# Patient Record
Sex: MALE | Race: BLACK OR AFRICAN AMERICAN | Employment: FULL TIME | URBAN - METROPOLITAN AREA
[De-identification: names, ages, dates, MRNs, and addresses within clinical notes are randomized per-mention and may not be internally consistent; named-entity substitution may affect disease eponyms.]

---

## 2017-02-15 ENCOUNTER — TELEPHONE (OUTPATIENT)
Dept: FAMILY MEDICINE CLINIC | Facility: CLINIC | Age: 57
End: 2017-02-15

## 2017-02-15 RX ORDER — BENZONATATE 200 MG/1
CAPSULE ORAL
Qty: 30 CAPSULE | Refills: 1 | Status: SHIPPED | OUTPATIENT
Start: 2017-02-15 | End: 2018-05-04 | Stop reason: ALTCHOICE

## 2017-02-16 ENCOUNTER — OFFICE VISIT (OUTPATIENT)
Dept: FAMILY MEDICINE CLINIC | Facility: CLINIC | Age: 57
End: 2017-02-16

## 2017-02-16 VITALS
TEMPERATURE: 97 F | SYSTOLIC BLOOD PRESSURE: 148 MMHG | BODY MASS INDEX: 22.12 KG/M2 | WEIGHT: 158 LBS | HEIGHT: 71 IN | DIASTOLIC BLOOD PRESSURE: 98 MMHG | HEART RATE: 64 BPM

## 2017-02-16 DIAGNOSIS — R05.9 COUGH: ICD-10-CM

## 2017-02-16 DIAGNOSIS — B34.9 VIRAL INFECTION: Primary | ICD-10-CM

## 2017-02-16 DIAGNOSIS — R03.0 ELEVATED BP WITHOUT DIAGNOSIS OF HYPERTENSION: ICD-10-CM

## 2017-02-16 DIAGNOSIS — R68.83 CHILLS (WITHOUT FEVER): ICD-10-CM

## 2017-02-16 DIAGNOSIS — R53.1 WEAKNESS GENERALIZED: ICD-10-CM

## 2017-02-16 LAB
BILIRUBIN, POC: NORMAL
BLOOD URINE, POC: NORMAL
CLARITY, POC: CLEAR
COLOR, POC: YELLOW
GLUCOSE URINE, POC: NORMAL
INFLUENZA A ANTIBODY: NORMAL
INFLUENZA B ANTIBODY: NORMAL
KETONES, POC: NORMAL
LEUKOCYTE EST, POC: NORMAL
NITRITE, POC: NORMAL
PH, POC: 6
PROTEIN, POC: NORMAL
SPECIFIC GRAVITY, POC: 1.02
UROBILINOGEN, POC: 0.2

## 2017-02-16 PROCEDURE — 87804 INFLUENZA ASSAY W/OPTIC: CPT | Performed by: FAMILY MEDICINE

## 2017-02-16 PROCEDURE — 81003 URINALYSIS AUTO W/O SCOPE: CPT | Performed by: FAMILY MEDICINE

## 2017-02-16 PROCEDURE — 99214 OFFICE O/P EST MOD 30 MIN: CPT | Performed by: FAMILY MEDICINE

## 2017-02-16 ASSESSMENT — PATIENT HEALTH QUESTIONNAIRE - PHQ9
1. LITTLE INTEREST OR PLEASURE IN DOING THINGS: 0
2. FEELING DOWN, DEPRESSED OR HOPELESS: 0
SUM OF ALL RESPONSES TO PHQ9 QUESTIONS 1 & 2: 0
SUM OF ALL RESPONSES TO PHQ QUESTIONS 1-9: 0

## 2017-02-16 ASSESSMENT — ENCOUNTER SYMPTOMS
EYES NEGATIVE: 1
ABDOMINAL PAIN: 0
VOMITING: 0
DIARRHEA: 1
SORE THROAT: 0
SHORTNESS OF BREATH: 0
NAUSEA: 0
BLOOD IN STOOL: 0
COUGH: 1

## 2017-03-28 ENCOUNTER — HOSPITAL ENCOUNTER (OUTPATIENT)
Age: 57
Setting detail: SPECIMEN
Discharge: HOME | End: 2017-03-28
Payer: COMMERCIAL

## 2017-03-28 ENCOUNTER — OFFICE VISIT (OUTPATIENT)
Dept: FAMILY MEDICINE CLINIC | Age: 57
End: 2017-03-28
Payer: COMMERCIAL

## 2017-03-28 VITALS
BODY MASS INDEX: 22.9 KG/M2 | DIASTOLIC BLOOD PRESSURE: 80 MMHG | WEIGHT: 160 LBS | HEART RATE: 65 BPM | HEIGHT: 70 IN | SYSTOLIC BLOOD PRESSURE: 120 MMHG

## 2017-03-28 DIAGNOSIS — M54.50 CHRONIC MIDLINE LOW BACK PAIN WITHOUT SCIATICA: ICD-10-CM

## 2017-03-28 DIAGNOSIS — L98.9 SKIN LESION OF CHEST WALL: ICD-10-CM

## 2017-03-28 DIAGNOSIS — M54.50 CHRONIC MIDLINE LOW BACK PAIN WITHOUT SCIATICA: Primary | ICD-10-CM

## 2017-03-28 DIAGNOSIS — Z13.9 SCREENING: ICD-10-CM

## 2017-03-28 DIAGNOSIS — M47.812 OSTEOARTHRITIS OF CERVICAL SPINE, UNSPECIFIED SPINAL OSTEOARTHRITIS COMPLICATION STATUS: ICD-10-CM

## 2017-03-28 DIAGNOSIS — G89.29 CHRONIC MIDLINE LOW BACK PAIN WITHOUT SCIATICA: Primary | ICD-10-CM

## 2017-03-28 DIAGNOSIS — L91.0 KELOID SCAR: ICD-10-CM

## 2017-03-28 DIAGNOSIS — G89.29 CHRONIC MIDLINE LOW BACK PAIN WITHOUT SCIATICA: ICD-10-CM

## 2017-03-28 LAB
ALBUMIN SERPL-MCNC: 4.2 G/DL (ref 3.5–5.2)
ALBUMIN/GLOBULIN RATIO: 1.7 (ref 1–2.5)
ALP BLD-CCNC: 92 U/L (ref 40–129)
ALT SERPL-CCNC: 28 U/L (ref 5–41)
ANION GAP SERPL CALCULATED.3IONS-SCNC: 14 MMOL/L (ref 9–17)
AST SERPL-CCNC: 21 U/L
BILIRUB SERPL-MCNC: 0.59 MG/DL (ref 0.3–1.2)
BUN BLDV-MCNC: 15 MG/DL (ref 6–20)
BUN/CREAT BLD: ABNORMAL (ref 9–20)
CALCIUM SERPL-MCNC: 9.4 MG/DL (ref 8.6–10.4)
CHLORIDE BLD-SCNC: 105 MMOL/L (ref 98–107)
CO2: 25 MMOL/L (ref 20–31)
CREAT SERPL-MCNC: 0.92 MG/DL (ref 0.7–1.2)
GFR AFRICAN AMERICAN: >60 ML/MIN
GFR NON-AFRICAN AMERICAN: >60 ML/MIN
GFR SERPL CREATININE-BSD FRML MDRD: ABNORMAL ML/MIN/{1.73_M2}
GFR SERPL CREATININE-BSD FRML MDRD: ABNORMAL ML/MIN/{1.73_M2}
GLUCOSE BLD-MCNC: 103 MG/DL (ref 70–99)
HEPATITIS C ANTIBODY: NONREACTIVE
HIV AG/AB: NONREACTIVE
POTASSIUM SERPL-SCNC: 4.3 MMOL/L (ref 3.7–5.3)
PROSTATE SPECIFIC ANTIGEN: 2.04 UG/L
SODIUM BLD-SCNC: 144 MMOL/L (ref 135–144)
TOTAL PROTEIN: 6.7 G/DL (ref 6.4–8.3)

## 2017-03-28 PROCEDURE — 36415 COLL VENOUS BLD VENIPUNCTURE: CPT | Performed by: FAMILY MEDICINE

## 2017-03-28 PROCEDURE — 99214 OFFICE O/P EST MOD 30 MIN: CPT | Performed by: FAMILY MEDICINE

## 2017-03-28 ASSESSMENT — ENCOUNTER SYMPTOMS
COUGH: 0
BACK PAIN: 1
ABDOMINAL PAIN: 0
BOWEL INCONTINENCE: 0
EYES NEGATIVE: 1
SHORTNESS OF BREATH: 0
BLOOD IN STOOL: 0

## 2017-11-16 ENCOUNTER — TELEPHONE (OUTPATIENT)
Dept: FAMILY MEDICINE CLINIC | Age: 57
End: 2017-11-16

## 2017-11-16 DIAGNOSIS — R05.9 COUGH: Primary | ICD-10-CM

## 2017-11-16 RX ORDER — GUAIFENESIN AND CODEINE PHOSPHATE 100; 10 MG/5ML; MG/5ML
5 SOLUTION ORAL 3 TIMES DAILY PRN
Qty: 118 ML | Refills: 0 | Status: SHIPPED | OUTPATIENT
Start: 2017-11-16 | End: 2018-03-01 | Stop reason: SDUPTHER

## 2017-11-16 RX ORDER — GUAIFENESIN AND CODEINE PHOSPHATE 100; 10 MG/5ML; MG/5ML
5 SOLUTION ORAL 3 TIMES DAILY PRN
Qty: 1 BOTTLE | Refills: 0 | Status: CANCELLED | OUTPATIENT
Start: 2017-11-16 | End: 2017-11-23

## 2017-11-16 NOTE — TELEPHONE ENCOUNTER
He needs to see a podiatrist for his toenails. I am not going to send in something else for that. Cough med sent in oct 25. Does he want refill of that?

## 2017-11-16 NOTE — TELEPHONE ENCOUNTER
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  Patient also states that his toenails are turning brownish black again and he would like something sent in for that also, if so please send to [facility] on [email or web address]  Patient last appt 3/28/17 but he will be coming in on Tuesday for his back pain  Health Maintenance   Topic Date Due    Flu vaccine (1) 09/05/2018 (Originally 9/1/2017)    Colon cancer screen colonoscopy  09/14/2020    Lipid screen  03/29/2021    DTaP/Tdap/Td vaccine (2 - Td) 09/08/2024    Hepatitis C screen  Completed    HIV screen  Completed             (applicable per patient's age: Cancer Screenings, Depression Screening, Fall Risk Screening, Immunizations)    AST (U/L)   Date Value   03/28/2017 21     ALT (U/L)   Date Value   03/28/2017 28     BUN (mg/dL)   Date Value   03/28/2017 15      (goal A1C is < 7)   (goal LDL is <100) need 30-50% reduction from baseline     BP Readings from Last 3 Encounters:   03/28/17 120/80   02/16/17 (!) 148/98   10/25/16 124/82    (goal /80)      All Future Testing planned in [facility]:      Next Visit Date:  Future Appointments  Date Time Provider Marli Mattson   11/21/2017 3:15 PM MD jim Milton [facility]            Patient Active Problem List:     Osteoarthritis     Chronic back pain     Osteoarthritis of cervical spine

## 2018-03-01 DIAGNOSIS — R05.9 COUGH: ICD-10-CM

## 2018-03-01 RX ORDER — GUAIFENESIN AND CODEINE PHOSPHATE 100; 10 MG/5ML; MG/5ML
5 SOLUTION ORAL 3 TIMES DAILY PRN
Qty: 118 ML | Refills: 0 | Status: SHIPPED | OUTPATIENT
Start: 2018-03-01 | End: 2018-10-19 | Stop reason: SDUPTHER

## 2018-03-09 ENCOUNTER — OFFICE VISIT (OUTPATIENT)
Dept: FAMILY MEDICINE CLINIC | Age: 58
End: 2018-03-09
Payer: COMMERCIAL

## 2018-03-09 VITALS
DIASTOLIC BLOOD PRESSURE: 84 MMHG | HEART RATE: 72 BPM | WEIGHT: 158 LBS | BODY MASS INDEX: 22.67 KG/M2 | TEMPERATURE: 96.7 F | SYSTOLIC BLOOD PRESSURE: 160 MMHG

## 2018-03-09 DIAGNOSIS — L91.0 KELOID SCAR OF SKIN: ICD-10-CM

## 2018-03-09 DIAGNOSIS — K21.9 GASTROESOPHAGEAL REFLUX DISEASE WITHOUT ESOPHAGITIS: ICD-10-CM

## 2018-03-09 DIAGNOSIS — R05.9 COUGH: ICD-10-CM

## 2018-03-09 DIAGNOSIS — I10 ESSENTIAL HYPERTENSION: Primary | ICD-10-CM

## 2018-03-09 PROCEDURE — 99214 OFFICE O/P EST MOD 30 MIN: CPT | Performed by: FAMILY MEDICINE

## 2018-03-09 RX ORDER — LOSARTAN POTASSIUM 25 MG/1
25 TABLET ORAL DAILY
Qty: 30 TABLET | Refills: 3 | Status: SHIPPED | OUTPATIENT
Start: 2018-03-09 | End: 2018-05-25 | Stop reason: SDUPTHER

## 2018-03-09 RX ORDER — OMEPRAZOLE 20 MG/1
20 CAPSULE, DELAYED RELEASE ORAL 2 TIMES DAILY
Qty: 30 CAPSULE | Refills: 3 | Status: SHIPPED | OUTPATIENT
Start: 2018-03-09 | End: 2019-04-05 | Stop reason: SDUPTHER

## 2018-03-09 ASSESSMENT — ENCOUNTER SYMPTOMS
CONSTIPATION: 0
EYES NEGATIVE: 1
COUGH: 1
SHORTNESS OF BREATH: 0
ABDOMINAL PAIN: 0
HEARTBURN: 1

## 2018-03-09 NOTE — PROGRESS NOTES
[facility] PHYSICIANS  DEREK MACIAS [facility]  [address] [id number]  Dept: [phone]    3/9/2018    CHIEF COMPLAINT    Chief Complaint   Patient presents with    Back Pain    Cough     2 weeks    Mole       HPI    Debora Collazo is a 65 y.o. male who presents   Chief Complaint   Patient presents with    Back Pain    Cough     2 weeks    Mole   . Recheck htn. Stopped taking  Med as he felt well. Has a scar on rt chest wall that he wants removed. He was referred to derm previously but didn't go. He is very anxious about having any procedure and wants to be sedated when it is done. Hypertension   This is a chronic problem. The current episode started more than 1 year ago. The problem is uncontrolled. Associated symptoms include chest pain (had one episode that woke him from sleep, resolved after he got up and moved around) and malaise/fatigue. Pertinent negatives include no palpitations or shortness of breath. Risk factors for coronary artery disease include male gender. Past treatments include diuretics and calcium channel blockers. Compliance problems: stopped medication. Gastroesophageal Reflux   He complains of chest pain (had one episode that woke him from sleep, resolved after he got up and moved around), coughing and heartburn. He reports no abdominal pain. This is a chronic problem. The current episode started more than 1 year ago. The problem occurs frequently. The heartburn wakes him from sleep. The heartburn does not limit his activity. The heartburn changes with position. The symptoms are aggravated by lying down and ETOH. Associated symptoms include weight loss. Risk factors include ETOH use. He has tried nothing for the symptoms. REVIEW OF SYSTEMS    Review of Systems   Constitutional: Positive for malaise/fatigue and weight loss. Negative for fever. HENT: Negative. Eyes: Negative. Respiratory: Positive for cough. - External Referral To Dermatology    4. Cough  Chronic, most likely related to reflux based on symptoms. Thania Mas received counseling on the following healthy behaviors: nutrition, medication adherence and decrease in alcohol consumption  Reviewed prior labs and health maintenance. Continue current medications, diet and exercise. Discussed use, benefit, and side effects of prescribed medications. Barriers to medication compliance addressed. Patient given educational materials - see patient instructions. All patient questions answered. Patient voiced understanding. Return in about 2 months (around 5/9/2018) for htn.         Electronically signed by Branden Rice MD on 3/9/18 at 3:45 PM

## 2018-03-09 NOTE — PATIENT INSTRUCTIONS
do not need. Keloid scarring can happen after these procedures. When should you call for help? Call your doctor now or seek immediate medical care if:  ? · You have signs of infection, such as:  ¨ Increased pain, swelling, warmth, or redness. ¨ Red streaks leading from the wound. ¨ Pus draining from the wound. ¨ A fever. ? Watch closely for changes in your health, and be sure to contact your doctor if:  ? · You do not get better as expected. Where can you learn more? Go to [email or web address] org and sign in to your [employer] account. Enter E474 in the [facility] box to learn more about \"Keloids: Care Instructions. \"     If you do not have an account, please click on the \"Sign Up Now\" link. Current as of: October 13, 2016  Content Version: 11.5  © 5913-2639 Healthwise, Incorporated. Care instructions adapted under license by [facility] ([facility]). If you have questions about a medical condition or this instruction, always ask your healthcare professional. [address] any warranty or liability for your use of this information.

## 2018-03-21 RX ORDER — TERBINAFINE HYDROCHLORIDE 250 MG/1
250 TABLET ORAL DAILY
Qty: 30 TABLET | Refills: 0 | Status: SHIPPED | OUTPATIENT
Start: 2018-03-21 | End: 2018-05-04 | Stop reason: ALTCHOICE

## 2018-04-24 LAB
BUN BLDV-MCNC: NORMAL MG/DL
CALCIUM SERPL-MCNC: NORMAL MG/DL
CHLORIDE BLD-SCNC: NORMAL MMOL/L
CO2: NORMAL MMOL/L
CREAT SERPL-MCNC: NORMAL MG/DL
GFR CALCULATED: NORMAL
GLUCOSE BLD-MCNC: NORMAL MG/DL
POTASSIUM SERPL-SCNC: NORMAL MMOL/L
SODIUM BLD-SCNC: NORMAL MMOL/L

## 2018-05-04 ENCOUNTER — OFFICE VISIT (OUTPATIENT)
Dept: FAMILY MEDICINE CLINIC | Age: 58
End: 2018-05-04
Payer: COMMERCIAL

## 2018-05-04 VITALS
WEIGHT: 155 LBS | HEART RATE: 63 BPM | BODY MASS INDEX: 22.19 KG/M2 | DIASTOLIC BLOOD PRESSURE: 80 MMHG | OXYGEN SATURATION: 98 % | SYSTOLIC BLOOD PRESSURE: 120 MMHG | HEIGHT: 70 IN

## 2018-05-04 DIAGNOSIS — I10 ESSENTIAL HYPERTENSION: ICD-10-CM

## 2018-05-04 DIAGNOSIS — I25.10 CORONARY ARTERY DISEASE INVOLVING NATIVE CORONARY ARTERY OF NATIVE HEART WITHOUT ANGINA PECTORIS: Primary | ICD-10-CM

## 2018-05-04 DIAGNOSIS — K21.9 GASTROESOPHAGEAL REFLUX DISEASE WITHOUT ESOPHAGITIS: ICD-10-CM

## 2018-05-04 PROCEDURE — 99214 OFFICE O/P EST MOD 30 MIN: CPT | Performed by: FAMILY MEDICINE

## 2018-05-04 RX ORDER — ASPIRIN 81 MG/1
81 TABLET, CHEWABLE ORAL
COMMUNITY
Start: 2018-04-26 | End: 2022-10-11

## 2018-05-04 RX ORDER — NITROGLYCERIN 0.4 MG/1
0.4 TABLET SUBLINGUAL EVERY 5 MIN PRN
COMMUNITY
End: 2019-04-05 | Stop reason: SDUPTHER

## 2018-05-04 RX ORDER — ATORVASTATIN CALCIUM 40 MG/1
40 TABLET, FILM COATED ORAL DAILY
COMMUNITY

## 2018-05-04 ASSESSMENT — ENCOUNTER SYMPTOMS
COUGH: 0
BACK PAIN: 1
CHEST TIGHTNESS: 0
SHORTNESS OF BREATH: 0
ABDOMINAL PAIN: 0
CONSTIPATION: 0
EYES NEGATIVE: 1

## 2018-05-04 ASSESSMENT — PATIENT HEALTH QUESTIONNAIRE - PHQ9
SUM OF ALL RESPONSES TO PHQ QUESTIONS 1-9: 0
SUM OF ALL RESPONSES TO PHQ9 QUESTIONS 1 & 2: 0
2. FEELING DOWN, DEPRESSED OR HOPELESS: 0
1. LITTLE INTEREST OR PLEASURE IN DOING THINGS: 0

## 2018-05-25 DIAGNOSIS — I10 ESSENTIAL HYPERTENSION: ICD-10-CM

## 2018-05-25 RX ORDER — LOSARTAN POTASSIUM 25 MG/1
25 TABLET ORAL DAILY
Qty: 90 TABLET | Refills: 1 | Status: SHIPPED | OUTPATIENT
Start: 2018-05-25 | End: 2019-04-05 | Stop reason: SDUPTHER

## 2018-08-03 ENCOUNTER — OFFICE VISIT (OUTPATIENT)
Dept: FAMILY MEDICINE CLINIC | Age: 58
End: 2018-08-03
Payer: COMMERCIAL

## 2018-08-03 VITALS
BODY MASS INDEX: 22.38 KG/M2 | HEART RATE: 78 BPM | SYSTOLIC BLOOD PRESSURE: 130 MMHG | WEIGHT: 156 LBS | DIASTOLIC BLOOD PRESSURE: 82 MMHG

## 2018-08-03 DIAGNOSIS — M25.562 ACUTE PAIN OF LEFT KNEE: Primary | ICD-10-CM

## 2018-08-03 PROCEDURE — 99213 OFFICE O/P EST LOW 20 MIN: CPT | Performed by: FAMILY MEDICINE

## 2018-08-03 RX ORDER — TRAMADOL HYDROCHLORIDE 50 MG/1
50 TABLET ORAL EVERY 6 HOURS PRN
Qty: 28 TABLET | Refills: 0 | Status: SHIPPED | OUTPATIENT
Start: 2018-08-03 | End: 2020-06-18 | Stop reason: SDUPTHER

## 2018-08-03 ASSESSMENT — ENCOUNTER SYMPTOMS
SHORTNESS OF BREATH: 0
COUGH: 0
EYES NEGATIVE: 1

## 2018-08-03 NOTE — PROGRESS NOTES
Cans of beer per week      Comment: 4-5 beers daily    Drug use: No    Sexual activity: Yes     Partners: Female     Other Topics Concern    None     Social History Narrative    None       SURGICAL HISTORY    Past Surgical History:   Procedure Laterality Date    ANKLE SURGERY Left     APPENDECTOMY      COLONOSCOPY      CORONARY ANGIOPLASTY WITH STENT PLACEMENT  04/2018    KNEE SURGERY Left     patella       CURRENT MEDICATIONS    Current Outpatient Prescriptions   Medication Sig Dispense Refill    ticagrelor (BRILINTA) 90 MG TABS tablet Take 90 mg by mouth      traMADol (ULTRAM) 50 MG tablet Take 1 tablet by mouth every 6 hours as needed for Pain for up to 7 days. Intended supply: 7 days. Take lowest dose possible to manage pain. 28 tablet 0    losartan (COZAAR) 25 MG tablet Take 1 tablet by mouth daily 90 tablet 1    aspirin 81 MG chewable tablet Take 81 mg by mouth      nitroGLYCERIN (NITROSTAT) 0.4 MG SL tablet Place 0.4 mg under the tongue every 5 minutes as needed for Chest pain up to max of 3 total doses. If no relief after 1 dose, call 911.  atorvastatin (LIPITOR) 40 MG tablet Take 40 mg by mouth daily      omeprazole (PRILOSEC) 20 MG delayed release capsule Take 1 capsule by mouth 2 times daily 30 capsule 3    ibuprofen (ADVIL;MOTRIN) 800 MG tablet Take 1 tablet by mouth every 8 hours as needed for Pain 90 tablet 0     No current facility-administered medications for this visit. ALLERGIES    No Known Allergies    PHYSICAL EXAM   Physical Exam   Constitutional: He is oriented to person, place, and time. He appears well-developed and well-nourished. HENT:   Head: Normocephalic. Mouth/Throat: Oropharynx is clear and moist.   Eyes: Pupils are equal, round, and reactive to light. Neck: Normal range of motion. Neck supple. No thyromegaly present. Cardiovascular: Normal rate, regular rhythm and normal heart sounds. No murmur heard.   Pulmonary/Chest: Effort normal and breath sounds normal. He has no wheezes. He has no rales. Abdominal: Soft. There is no tenderness. There is no rebound and no guarding. Musculoskeletal: Normal range of motion. He exhibits tenderness (left knee with effusion proximal medial knee. prior surgical scar). He exhibits no edema or deformity. Lymphadenopathy:     He has no cervical adenopathy. Neurological: He is alert and oriented to person, place, and time. Skin: Skin is warm and dry. Psychiatric: He has a normal mood and affect. His behavior is normal.   Vitals reviewed. Assessment/PLan  1. Acute pain of left knee  Acute. Get a knee brace with patellar opening. See ortho. - XR KNEE LEFT (3 VIEWS); Future  - traMADol (ULTRAM) 50 MG tablet; Take 1 tablet by mouth every 6 hours as needed for Pain for up to 7 days. Intended supply: 7 days. Take lowest dose possible to manage pain. Dispense: 28 tablet; Refill: 0  - Duke Ceja MD, Orthopedics [facility] received counseling on the following healthy behaviors: medication adherence  Reviewed prior labs and health maintenance. Continue current medications, diet and exercise. Discussed use, benefit, and side effects of prescribed medications. Barriers to medication compliance addressed. Patient given educational materials - see patient instructions. All patient questions answered. Patient voiced understanding. Return if symptoms worsen or fail to improve.         Electronically signed by Crystal Salguero MD on 8/3/18 at 11:18 AM

## 2018-08-13 ENCOUNTER — OFFICE VISIT (OUTPATIENT)
Dept: ORTHOPEDIC SURGERY | Age: 58
End: 2018-08-13
Payer: COMMERCIAL

## 2018-08-13 VITALS — WEIGHT: 160 LBS | HEIGHT: 70 IN | BODY MASS INDEX: 22.9 KG/M2

## 2018-08-13 DIAGNOSIS — M25.562 ACUTE PAIN OF LEFT KNEE: ICD-10-CM

## 2018-08-13 DIAGNOSIS — M17.12 OSTEOARTHRITIS OF LEFT PATELLOFEMORAL JOINT: Primary | ICD-10-CM

## 2018-08-13 PROCEDURE — 20610 DRAIN/INJ JOINT/BURSA W/O US: CPT | Performed by: ORTHOPAEDIC SURGERY

## 2018-08-13 PROCEDURE — 99203 OFFICE O/P NEW LOW 30 MIN: CPT | Performed by: ORTHOPAEDIC SURGERY

## 2018-08-13 RX ORDER — TRIAMCINOLONE ACETONIDE 40 MG/ML
40 INJECTION, SUSPENSION INTRA-ARTICULAR; INTRAMUSCULAR ONCE
Status: COMPLETED | OUTPATIENT
Start: 2018-08-13 | End: 2018-08-13

## 2018-08-13 RX ORDER — LIDOCAINE HYDROCHLORIDE 10 MG/ML
5 INJECTION, SOLUTION INFILTRATION; PERINEURAL ONCE
Status: COMPLETED | OUTPATIENT
Start: 2018-08-13 | End: 2018-08-13

## 2018-08-13 RX ADMIN — LIDOCAINE HYDROCHLORIDE 5 ML: 10 INJECTION, SOLUTION INFILTRATION; PERINEURAL at 11:59

## 2018-08-13 RX ADMIN — TRIAMCINOLONE ACETONIDE 40 MG: 40 INJECTION, SUSPENSION INTRA-ARTICULAR; INTRAMUSCULAR at 12:00

## 2018-08-13 NOTE — PROGRESS NOTES
Orthopedic Knee Encounter Note     Chief complaint: Left knee pain    HPI: Mayuri Julien is a 65 y.o. old male who presents for evaluation of left knee pain. He reports a history of left knee surgical procedure done over 20 years ago while in [place]. It's unclear as to nature of his surgery. He states however that he is been doing well since then with some intermittent soreness see. However about 3 weeks ago he stepped into a hole and twisted the left knee. Since then he's had increased pain and swelling. His pain is intermittent and diffusely involved the anterior aspect of the knee. Typically exacerbated by weightbearing activities. He has stiffness especially after a period of rest.  He denies any radicular symptoms or associated numbness or tingling but does report having some catching and locking sensations in the knee. Previous treatment:    NSAIDs: Ibuprofen 800 mg    Injections:  None    Physical therapy: No    Surgeries: History of left knee surgery about 20 years ago in [place] not otherwise specified    Review of Systems:     Constitution: no fever or chills   Pain level: 8/10  Musculoskeletal: As noted in the HPI   Neurologic: no neurologic symptoms    Past Medical History  Ester Boyce  has a past medical history of Alcohol abuse; Allergic rhinitis; CAD (coronary artery disease); Chronic back pain; GERD (gastroesophageal reflux disease); Hypertension; and Osteoarthritis. Past Surgical History  Ester Boyce  has a past surgical history that includes Ankle surgery (Left); knee surgery (Left); Appendectomy; Colonoscopy; and Coronary angioplasty with stent (04/2018).     Current Medications  Current Outpatient Prescriptions   Medication Sig Dispense Refill    ticagrelor (BRILINTA) 90 MG TABS tablet Take 90 mg by mouth      losartan (COZAAR) 25 MG tablet Take 1 tablet by mouth daily 90 tablet 1    aspirin 81 MG chewable tablet Take 81 mg by mouth      nitroGLYCERIN (NITROSTAT) 0.4 MG SL tablet Place 0.4 mg under the tongue every 5 minutes as needed for Chest pain up to max of 3 total doses. If no relief after 1 dose, call 911.  atorvastatin (LIPITOR) 40 MG tablet Take 40 mg by mouth daily      omeprazole (PRILOSEC) 20 MG delayed release capsule Take 1 capsule by mouth 2 times daily 30 capsule 3    ibuprofen (ADVIL;MOTRIN) 800 MG tablet Take 1 tablet by mouth every 8 hours as needed for Pain 90 tablet 0     No current facility-administered medications for this visit. Allergies  Allergies have been reviewed. Farhana Cantor has No Known Allergies. Social History  Farhana Cantor  reports that he quit smoking about 12 years ago. He smoked 0.00 packs per day for 15.00 years. He has never used smokeless tobacco. He reports that he drinks about 12.6 oz of alcohol per week . He reports that he does not use drugs. Family History  Adan's family history includes Heart Disease in his brother, father, mother, and sister; Other (age of onset: 47) in his sister. Physical Exam:     Ht 5' 10\" (1.778 m)   Wt 160 lb (72.6 kg)   BMI 22.96 kg/m²    General Appearance: alert, well appearing, and in no distress  Mental Status: alert, oriented to person, place, and time  Gait: antalgic  Hips: Good pain-free ROM without crepitation    Knee: Bilateral    Skin: warm and dry, no rash or erythema. Moderate effusion in the left knee. Well-healed anterior midline incisional scar over the left knee. Vasculature: 2+ pedal pulses bilaterally  Neuro: Sensation grossly intact to light touch diffusely  Alignment: Normal  Tenderness: Tender to palpation along the medial joint line of the left knee.     ROM: (Degrees)    Right   A P   Left   A P    Extension  0    Extension  0   Flexion   105    Flexion   130      Crepitation  No    Crepitation  No      Muscle strength:    Right       Left    Flexion   5    Flexion   5  Extension  5    Extension  5  SLR   5    SLR   5    Extensor lag   n    Extensor lag  n      Special testing:    Right          Left    y    Pain with deep knee flexion   n  y    Patellar grind     n  n    Patellar apprehension    n  n    Patellar glide     n    n    Lachman     n  n    Anterior drawer    n  n    Pivot shift     n  n    Posterior drawer    n  n    Dial test     n  n    Posterolateral drawer    n  n    Posterior Sag     n  n    MCL      n  n    LCL      n    y    Medial joint line tenderness   n  n    Lateral joint line tenderness   n  y    Susanne's     n      Imaging:  Xrays: 4 views of the left knee obtained on 8/13/18 were independently reviewed   Indications: Left knee pain  Findings: Well preserved medial and lateral joint compartment spaces. Moderate patellofemoral compartment space loss associated with some osteophytic change. Impression: Moderate left knee patellofemoral osteoarthritis    Impression/Plan:     Ana Paula Gutierrez is a 65 y.o. old male with left knee patellofemoral osteoarthritis and possible medial meniscus tear. I had a discussion with the patient today with progressive in nature and extent of his problem and we discussed treatment options available to him. I recommend proceeding conservatively at this time with the use of physical therapy. We discussed use of prescription NSAIDs versus cortisone injection. He elected for the latter which was administered as outlined below. Again he was sent for physical therapy. I'll see him back in clinic in 3 months for reevaluation but he was encouraged to return our call earlier with questions and/or concerns. Procedure: left intraarticular knee injection  Following an appropriate discussion with the patient regarding the risks and benefits of the procedure he consented to proceed. his left knee was prepped using chlorhexadine solution. Using aseptic technique and through a superolateral approach, his left knee joint was injected with a 5 cc mixture of 1cc 40mg/ml kenalog and 4 cc of 1% lidocaine without epinephrine.  A band aid was applied to the injection site. he tolerated the injection with no immediate adverse reactions.         NA = Not assessed  n = No  y = Yes  SLR = Straight leg raise  MCL = Medial collateral ligament  LCL = Lateral collateral ligament

## 2018-10-02 ENCOUNTER — TELEPHONE (OUTPATIENT)
Dept: FAMILY MEDICINE CLINIC | Age: 58
End: 2018-10-02

## 2018-10-02 NOTE — TELEPHONE ENCOUNTER
Patient called and stated you gave him tramadol for his pain. He stated he went to his sisters house and was in a lot of pain and didn't have him medication and took her percocet- 2 tablets twice. He got drugged tested at work and they were in his system. He cant go back to work till [employer] out of his system.  Per Dr. Cindy Aceves he can come in for a nurse visit for drug test.

## 2018-10-08 ENCOUNTER — NURSE ONLY (OUTPATIENT)
Dept: FAMILY MEDICINE CLINIC | Age: 58
End: 2018-10-08
Payer: COMMERCIAL

## 2018-10-08 DIAGNOSIS — Z02.83 ENCOUNTER FOR DRUG SCREENING: ICD-10-CM

## 2018-10-08 DIAGNOSIS — Z23 NEED FOR INFLUENZA VACCINATION: Primary | ICD-10-CM

## 2018-10-08 LAB

## 2018-10-08 PROCEDURE — 80305 DRUG TEST PRSMV DIR OPT OBS: CPT | Performed by: FAMILY MEDICINE

## 2018-10-08 PROCEDURE — 90471 IMMUNIZATION ADMIN: CPT | Performed by: NURSE PRACTITIONER

## 2018-10-08 PROCEDURE — 90688 IIV4 VACCINE SPLT 0.5 ML IM: CPT | Performed by: NURSE PRACTITIONER

## 2018-10-08 NOTE — PROGRESS NOTES
Vaccine Information Sheet, \"Influenza - Inactivated\"  given to Ileana Piedra, or parent/legal guardian of  Ileana Piedra and verbalized understanding. Patient responses:    Have you ever had a reaction to a flu vaccine? No  Are you able to eat eggs without adverse effects? Yes  Do you have any current illness? No  Have you ever had Guillian [place] Syndrome? No    Flu vaccine given per order. Please see immunization tab.

## 2018-10-19 DIAGNOSIS — R05.9 COUGH: ICD-10-CM

## 2018-10-19 RX ORDER — GUAIFENESIN AND CODEINE PHOSPHATE 100; 10 MG/5ML; MG/5ML
5 SOLUTION ORAL 3 TIMES DAILY PRN
Qty: 118 ML | Refills: 0 | Status: SHIPPED | OUTPATIENT
Start: 2018-10-19 | End: 2018-10-26

## 2018-11-13 ENCOUNTER — OFFICE VISIT (OUTPATIENT)
Dept: ORTHOPEDIC SURGERY | Age: 58
End: 2018-11-13
Payer: COMMERCIAL

## 2018-11-13 VITALS — HEIGHT: 70 IN | BODY MASS INDEX: 22.9 KG/M2 | WEIGHT: 160 LBS

## 2018-11-13 DIAGNOSIS — M25.562 CHRONIC PAIN OF LEFT KNEE: Primary | ICD-10-CM

## 2018-11-13 DIAGNOSIS — G89.29 CHRONIC PAIN OF LEFT KNEE: Primary | ICD-10-CM

## 2018-11-13 PROCEDURE — 99212 OFFICE O/P EST SF 10 MIN: CPT | Performed by: ORTHOPAEDIC SURGERY

## 2019-01-10 ENCOUNTER — TELEPHONE (OUTPATIENT)
Dept: FAMILY MEDICINE CLINIC | Age: 59
End: 2019-01-10

## 2019-02-07 ENCOUNTER — TELEPHONE (OUTPATIENT)
Dept: FAMILY MEDICINE CLINIC | Age: 59
End: 2019-02-07

## 2019-03-27 ENCOUNTER — TELEPHONE (OUTPATIENT)
Dept: FAMILY MEDICINE CLINIC | Age: 59
End: 2019-03-27

## 2019-04-05 ENCOUNTER — TELEPHONE (OUTPATIENT)
Dept: FAMILY MEDICINE CLINIC | Age: 59
End: 2019-04-05

## 2019-04-05 ENCOUNTER — OFFICE VISIT (OUTPATIENT)
Dept: FAMILY MEDICINE CLINIC | Age: 59
End: 2019-04-05
Payer: COMMERCIAL

## 2019-04-05 VITALS — DIASTOLIC BLOOD PRESSURE: 86 MMHG | HEART RATE: 60 BPM | SYSTOLIC BLOOD PRESSURE: 148 MMHG | TEMPERATURE: 96.2 F

## 2019-04-05 DIAGNOSIS — I20.8 ANGINA AT REST (HCC): ICD-10-CM

## 2019-04-05 DIAGNOSIS — J45.22 MILD INTERMITTENT REACTIVE AIRWAY DISEASE WITH STATUS ASTHMATICUS: ICD-10-CM

## 2019-04-05 DIAGNOSIS — I10 ESSENTIAL HYPERTENSION: ICD-10-CM

## 2019-04-05 DIAGNOSIS — R05.9 COUGH: Primary | ICD-10-CM

## 2019-04-05 DIAGNOSIS — K21.9 GASTROESOPHAGEAL REFLUX DISEASE WITHOUT ESOPHAGITIS: ICD-10-CM

## 2019-04-05 PROCEDURE — 99213 OFFICE O/P EST LOW 20 MIN: CPT | Performed by: NURSE PRACTITIONER

## 2019-04-05 RX ORDER — NITROGLYCERIN 0.4 MG/1
0.4 TABLET SUBLINGUAL EVERY 5 MIN PRN
Qty: 25 TABLET | Refills: 5 | Status: SHIPPED | OUTPATIENT
Start: 2019-04-05 | End: 2020-06-18 | Stop reason: ALTCHOICE

## 2019-04-05 RX ORDER — OMEPRAZOLE 20 MG/1
20 CAPSULE, DELAYED RELEASE ORAL 2 TIMES DAILY
Qty: 30 CAPSULE | Refills: 3 | Status: SHIPPED | OUTPATIENT
Start: 2019-04-05 | End: 2019-04-11

## 2019-04-05 RX ORDER — LOSARTAN POTASSIUM 25 MG/1
25 TABLET ORAL DAILY
Qty: 90 TABLET | Refills: 1 | Status: SHIPPED | OUTPATIENT
Start: 2019-04-05 | End: 2019-09-21 | Stop reason: SDUPTHER

## 2019-04-05 RX ORDER — ALBUTEROL SULFATE 90 UG/1
2 AEROSOL, METERED RESPIRATORY (INHALATION) EVERY 6 HOURS PRN
Qty: 1 INHALER | Refills: 3 | Status: SHIPPED | OUTPATIENT
Start: 2019-04-05 | End: 2020-06-18 | Stop reason: ALTCHOICE

## 2019-04-05 ASSESSMENT — ENCOUNTER SYMPTOMS
GASTROINTESTINAL NEGATIVE: 1
NAUSEA: 0
EYES NEGATIVE: 1
SHORTNESS OF BREATH: 1
HEARTBURN: 0
CONSTIPATION: 0
ABDOMINAL PAIN: 0
WHEEZING: 0
HEMOPTYSIS: 0
DIARRHEA: 0
COUGH: 1
SORE THROAT: 0

## 2019-04-05 ASSESSMENT — PATIENT HEALTH QUESTIONNAIRE - PHQ9
SUM OF ALL RESPONSES TO PHQ QUESTIONS 1-9: 0
2. FEELING DOWN, DEPRESSED OR HOPELESS: 0
1. LITTLE INTEREST OR PLEASURE IN DOING THINGS: 0
SUM OF ALL RESPONSES TO PHQ QUESTIONS 1-9: 0
SUM OF ALL RESPONSES TO PHQ9 QUESTIONS 1 & 2: 0

## 2019-04-05 NOTE — LETTER
[facility]  [address] [place] [address]  Phone: [phone]  Fax: [phone]    NLEIDA Huang CNP        April 5, 2019     Patient: Doroteo Hinkle   YOB: 1960   Date of Visit: 4/5/2019       To Whom It May Concern: It is my medical opinion that Doroteo Hinkle should be excused from work on 04/5/2019. He may return to work on 4/8/2018    If you have any questions or concerns, please don't hesitate to call.     Sincerely,        NELIDA Huang CNP

## 2019-04-05 NOTE — PROGRESS NOTES
Visit Information    Have you changed or started any medications since your last visit including any over-the-counter medicines, vitamins, or herbal medicines? no   Have you stopped taking any of your medications? Is so, why? -  yes   Are you having any side effects from any of your medications? - yes - cough    Have you seen any other physician or provider since your last visit? yes -    Have you had any other diagnostic tests since your last visit? yes -    Have you been seen in the emergency room and/or had an admission in a hospital since we last saw you?  no   Have you had your routine dental cleaning in the past 6 months?  no     Do you have an active MyChart account? If no, what is the barrier?   No:     Patient Care Team:  Vanessa Bedolla MD as PCP - General (Family Medicine)    Medical History Review  Past Medical, Family, and Social History reviewed and does contribute to the patient presenting condition    Health Maintenance   Topic Date Due    Shingles Vaccine (1 of 2) 10/26/2011    Potassium monitoring  04/24/2019    Creatinine monitoring  04/24/2019    Colon cancer screen colonoscopy  09/14/2020    Lipid screen  03/29/2021    DTaP/Tdap/Td vaccine (2 - Td) 09/08/2024    Flu vaccine  Completed    Hepatitis C screen  Completed    HIV screen  Completed

## 2019-04-05 NOTE — LETTER
[facility] [address] [place] [address]  Phone: [phone]  Fax: [phone]    NELIDA Olivier CNP        April 5, 2019     Patient: Valeria Granda   YOB: 1960   Date of Visit: 4/5/2019       To Whom It May Concern: It is my medical opinion that Valeria colvin be excused from work on 04/5/2019. .    If you have any questions or concerns, please don't hesitate to call.     Sincerely,        NELIDA Olivier CNP

## 2019-04-05 NOTE — TELEPHONE ENCOUNTER
Please call patient and let him know that after speaking with Dr. Alee Stevenson she agrees that he should call his cardiologist and tell them that we are concerned that his cough & SOB are related to his Brilinta. Please instruct patient to ask if they want to change it to a different medication.  Thank you

## 2019-04-05 NOTE — PROGRESS NOTES
[facility]  DEREK MACIAS [facility] PL[address] [facility]  [address] [facility]  [address] [address]  Dept: [phone]    4/5/2019    CHIEF COMPLAINT    Chief Complaint   Patient presents with    Cough     2 weeks     HPI    Diandra Lees is a 63 y.o. male who presents   Chief Complaint   Patient presents with    Cough     2 weeks     Cough and SOB for the last 3-4 weeks. He also reports an increase in his fatigue. Denies CP, palpitations or leg swelling. He stopped his losartan and Prilosec for \"at least a couple months\". He denies any current chest pains, but does not have any more nitroglycerine. His cough/SOB seems worse when he talks for long periods or when he goes in and out of the cold. He has had coughing fits recently that increased his SOB. No h/o COPD or asthma. Denies any sinus sx. Cough   This is a new problem. The current episode started 1 to 4 weeks ago. The problem has been unchanged. The problem occurs constantly. The cough is non-productive. Associated symptoms include shortness of breath. Pertinent negatives include no chest pain, chills, fever, headaches, heartburn, hemoptysis (h/o heartburn. Hasn't been taking his prilosec ), nasal congestion, postnasal drip, sore throat or wheezing. The symptoms are aggravated by fumes and cold air. He has tried OTC cough suppressant for the symptoms. The treatment provided no relief. There is no history of asthma, COPD or environmental allergies. Vitals:    04/05/19 0911   BP: (!) 148/86   Pulse: 60   Temp: 96.2 °F (35.7 °C)     REVIEW OF SYSTEMS    Review of Systems   Constitutional: Negative. Negative for chills and fever. HENT: Negative. Negative for congestion, postnasal drip and sore throat. Eyes: Negative. Negative for visual disturbance (blurred vision. Getting new glasses ). Respiratory: Positive for cough and shortness of breath. Negative for hemoptysis (h/o heartburn.  Hasn't been taking his prilosec ) and wheezing. Cardiovascular: Negative for chest pain and palpitations. Gastrointestinal: Negative. Negative for abdominal pain, constipation, diarrhea, heartburn and nausea. Genitourinary: Negative. Negative for difficulty urinating. Allergic/Immunologic: Negative for environmental allergies. Neurological: Negative. Negative for dizziness and headaches. PAST MEDICAL HISTORY    Past Medical History:   Diagnosis Date    Alcohol abuse     Allergic rhinitis     CAD (coronary artery disease) 2018    Chronic back pain     GERD (gastroesophageal reflux disease)     Hypertension     Osteoarthritis        FAMILY HISTORY    Family History   Problem Relation Age of Onset    Heart Disease Father     Heart Disease Mother     Heart Disease Brother     Heart Disease Sister     Other Sister 47        cabg     SOCIAL HISTORY    Social History     Socioeconomic History    Marital status:      Spouse name: None    Number of children: None    Years of education: None    Highest education level: None   Occupational History    None   Social Needs    Financial resource strain: None    Food insecurity:     Worry: None     Inability: None    Transportation needs:     Medical: None     Non-medical: None   Tobacco Use    Smoking status: Former Smoker     Packs/day: 0.00     Years: 15.00     Pack years: 0.00     Last attempt to quit: 2006     Years since quittin.2    Smokeless tobacco: Never Used   Substance and Sexual Activity    Alcohol use:  Yes     Alcohol/week: 12.6 oz     Types: 21 Cans of beer per week     Comment: 4-5 beers daily    Drug use: No    Sexual activity: Yes     Partners: Female   Lifestyle    Physical activity:     Days per week: None     Minutes per session: None    Stress: None   Relationships    Social connections:     Talks on phone: None     Gets together: None     Attends Orthodoxy service: None     Active member of club or organization: None     Attends meetings of clubs or organizations: None     Relationship status: None    Intimate partner violence:     Fear of current or ex partner: None     Emotionally abused: None     Physically abused: None     Forced sexual activity: None   Other Topics Concern    None   Social History Narrative    None       SURGICAL HISTORY    Past Surgical History:   Procedure Laterality Date    ANKLE SURGERY Left     APPENDECTOMY      COLONOSCOPY      CORONARY ANGIOPLASTY WITH STENT PLACEMENT  04/2018    KNEE SURGERY Left     patella     CURRENT MEDICATIONS    Current Outpatient Medications   Medication Sig Dispense Refill    losartan (COZAAR) 25 MG tablet Take 1 tablet by mouth daily 90 tablet 1    nitroGLYCERIN (NITROSTAT) 0.4 MG SL tablet Place 1 tablet under the tongue every 5 minutes as needed for Chest pain up to max of 3 total doses. If no relief after 1 dose, call 911. 25 tablet 5    omeprazole (PRILOSEC) 20 MG delayed release capsule Take 1 capsule by mouth 2 times daily 30 capsule 3    albuterol sulfate HFA (PROVENTIL HFA) 108 (90 Base) MCG/ACT inhaler Inhale 2 puffs into the lungs every 6 hours as needed for Wheezing 1 Inhaler 3    ticagrelor (BRILINTA) 90 MG TABS tablet Take 90 mg by mouth      aspirin 81 MG chewable tablet Take 81 mg by mouth      atorvastatin (LIPITOR) 40 MG tablet Take 40 mg by mouth daily      ibuprofen (ADVIL;MOTRIN) 800 MG tablet Take 1 tablet by mouth every 8 hours as needed for Pain 90 tablet 0     No current facility-administered medications for this visit. ALLERGIES    No Known Allergies    PHYSICAL EXAM   Physical Exam   Constitutional: He is oriented to person, place, and time. Vital signs are normal. He appears well-developed and well-nourished. He is cooperative. No distress. HENT:   Head: Normocephalic. Right Ear: Hearing normal.   Left Ear: Hearing normal.   Mouth/Throat: No posterior oropharyngeal edema or posterior oropharyngeal erythema.    Eyes: Right eye exhibits no discharge. Left eye exhibits no discharge. Pupils are equal.   Neck: Normal range of motion. Cardiovascular: Normal rate, regular rhythm, S1 normal, S2 normal, normal heart sounds and normal pulses. No murmur heard. Pulses:       Radial pulses are 2+ on the right side, and 2+ on the left side. Pulmonary/Chest: Effort normal and breath sounds normal. No accessory muscle usage. No tachypnea. No respiratory distress. He has no decreased breath sounds. He has no wheezes. Dry cough noted a few times during apt. Neurological: He is alert and oriented to person, place, and time. Skin: Skin is warm and dry. He is not diaphoretic. Psychiatric: He has a normal mood and affect. His behavior is normal.   Nursing note and vitals reviewed. Assessment/PLan  1. Cough    -Unclear etiology. May be related to d/c Prilosec, a new onset reactive airway disease or secondary to his Brilinta. Will discuss further with Dr. Ayah Howell on Monday when she returns. 2. Gastroesophageal reflux disease without esophagitis    - omeprazole (PRILOSEC) 20 MG delayed release capsule; Take 1 capsule by mouth 2 times daily  Dispense: 30 capsule; Refill: 3  -Advised to resume Prilosec today. Cough sx should improve over the next 1-2 weeks if it is secondary to GERD. -Advised of activity and dietary modification to aide in the reduction of GERD sx.     3. Mild intermittent reactive airway disease with status asthmaticus    - albuterol sulfate HFA (PROVENTIL HFA) 108 (90 Base) MCG/ACT inhaler; Inhale 2 puffs into the lungs every 6 hours as needed for Wheezing  Dispense: 1 Inhaler; Refill: 3  -Advised of proper use and of potential side effects. -Advised patient that the inhaler is to be used when he is feeling SOB to help open his airways. 4. Essential hypertension    - losartan (COZAAR) 25 MG tablet; Take 1 tablet by mouth daily  Dispense: 90 tablet; Refill: 1  -Chronic.  Elevated in office today due to patient d/c medication \"at

## 2019-04-05 NOTE — TELEPHONE ENCOUNTER
NOT URGENT. Patient informed he would receive a c/b on Monday. Saw patient today for cough. GERD/asthma in patient's differential, but wondering if you feel he should call cardiology due to his SOB. I'm wondering if it could be related to his Brilinta. Please advise. Patient had d/c his Prilosec and losartan for \"at least a few months\" prior to the cough starting. My note is closed if you would like to review it.

## 2019-04-09 ENCOUNTER — TELEPHONE (OUTPATIENT)
Dept: FAMILY MEDICINE CLINIC | Age: 59
End: 2019-04-09

## 2019-04-09 DIAGNOSIS — K21.9 GASTROESOPHAGEAL REFLUX DISEASE WITHOUT ESOPHAGITIS: Primary | ICD-10-CM

## 2019-04-09 RX ORDER — PANTOPRAZOLE SODIUM 40 MG/1
40 TABLET, DELAYED RELEASE ORAL
Qty: 30 TABLET | Refills: 5 | Status: SHIPPED | OUTPATIENT
Start: 2019-04-09 | End: 2019-04-11

## 2019-04-09 NOTE — TELEPHONE ENCOUNTER
Pharmacy sent denial for Omeprazole 20 mg 1 capsule mouth twice a day. Switching to alternative pantoprazole 40 mg daily per insurance coverage.

## 2019-04-11 ENCOUNTER — TELEPHONE (OUTPATIENT)
Dept: FAMILY MEDICINE CLINIC | Age: 59
End: 2019-04-11

## 2019-04-11 DIAGNOSIS — R05.9 COUGH: Primary | ICD-10-CM

## 2019-04-11 DIAGNOSIS — K21.9 GASTROESOPHAGEAL REFLUX DISEASE WITHOUT ESOPHAGITIS: ICD-10-CM

## 2019-04-11 RX ORDER — OMEPRAZOLE 20 MG/1
20 CAPSULE, DELAYED RELEASE ORAL
Qty: 90 CAPSULE | Refills: 1 | Status: SHIPPED | OUTPATIENT
Start: 2019-04-11 | End: 2020-06-18 | Stop reason: ALTCHOICE

## 2019-04-17 ENCOUNTER — TELEPHONE (OUTPATIENT)
Dept: FAMILY MEDICINE CLINIC | Age: 59
End: 2019-04-17

## 2019-04-17 DIAGNOSIS — K21.9 GASTROESOPHAGEAL REFLUX DISEASE WITHOUT ESOPHAGITIS: Primary | ICD-10-CM

## 2019-04-17 RX ORDER — LANSOPRAZOLE 30 MG/1
30 CAPSULE, DELAYED RELEASE ORAL DAILY
Qty: 30 CAPSULE | Refills: 0 | Status: SHIPPED | OUTPATIENT
Start: 2019-04-17 | End: 2020-06-18 | Stop reason: ALTCHOICE

## 2019-04-23 ENCOUNTER — OFFICE VISIT (OUTPATIENT)
Dept: FAMILY MEDICINE CLINIC | Age: 59
End: 2019-04-23
Payer: COMMERCIAL

## 2019-04-23 VITALS
DIASTOLIC BLOOD PRESSURE: 80 MMHG | SYSTOLIC BLOOD PRESSURE: 136 MMHG | BODY MASS INDEX: 24.68 KG/M2 | HEART RATE: 80 BPM | WEIGHT: 172 LBS

## 2019-04-23 DIAGNOSIS — Z13.228 SCREENING FOR METABOLIC DISORDER: ICD-10-CM

## 2019-04-23 DIAGNOSIS — I25.10 CORONARY ARTERY DISEASE INVOLVING NATIVE CORONARY ARTERY OF NATIVE HEART WITHOUT ANGINA PECTORIS: ICD-10-CM

## 2019-04-23 DIAGNOSIS — Z12.5 PROSTATE CANCER SCREENING: ICD-10-CM

## 2019-04-23 DIAGNOSIS — I10 ESSENTIAL HYPERTENSION: ICD-10-CM

## 2019-04-23 DIAGNOSIS — Z00.00 ENCOUNTER FOR ANNUAL PHYSICAL EXAM: Primary | ICD-10-CM

## 2019-04-23 DIAGNOSIS — Z80.42 FAMILY HX OF PROSTATE CANCER: ICD-10-CM

## 2019-04-23 DIAGNOSIS — E78.2 MIXED HYPERLIPIDEMIA: ICD-10-CM

## 2019-04-23 PROCEDURE — 99396 PREV VISIT EST AGE 40-64: CPT | Performed by: NURSE PRACTITIONER

## 2019-04-23 ASSESSMENT — ENCOUNTER SYMPTOMS
CONSTIPATION: 0
GASTROINTESTINAL NEGATIVE: 1
NAUSEA: 0
RESPIRATORY NEGATIVE: 1
BLOOD IN STOOL: 0
DIARRHEA: 0
BACK PAIN: 0
EYES NEGATIVE: 1
SHORTNESS OF BREATH: 0
COUGH: 0
ABDOMINAL PAIN: 0
VOMITING: 0

## 2019-04-23 NOTE — PROGRESS NOTES
Visit Information    Have you changed or started any medications since your last visit including any over-the-counter medicines, vitamins, or herbal medicines? no   Have you stopped taking any of your medications? Is so, why? -  no  Are you having any side effects from any of your medications? - no    Have you seen any other physician or provider since your last visit? yes -    Have you had any other diagnostic tests since your last visit? yes -    Have you been seen in the emergency room and/or had an admission in a hospital since we last saw you?  no   Have you had your routine dental cleaning in the past 6 months?  no     Do you have an active MyChart account? If no, what is the barrier?   No:     Patient Care Team:  Haleigh Brennan MD as PCP - General (Family Medicine)    Medical History Review  Past Medical, Family, and Social History reviewed and does contribute to the patient presenting condition    Health Maintenance   Topic Date Due    Pneumococcal 0-64 years Vaccine (1 of 1 - PPSV23) 10/26/1967    Shingles Vaccine (1 of 2) 10/26/2011    Potassium monitoring  04/24/2019    Creatinine monitoring  04/24/2019    Colon cancer screen colonoscopy  09/14/2020    Lipid screen  03/29/2021    DTaP/Tdap/Td vaccine (2 - Td) 09/08/2024    Flu vaccine  Completed    Hepatitis C screen  Completed    HIV screen  Completed

## 2019-04-23 NOTE — LETTER
[address] [place] [address]  Phone: [phone]  Fax: [phone]    NELIDA Hamilton CNP        April 23, 2019     Patient: Gala José   YOB: 1960   Date of Visit: 4/23/2019       To Whom it May Concern: Gala José was seen in my clinic on 4/23/2019. He may return to work on 04/24/2019. If you have any questions or concerns, please don't hesitate to call.     Sincerely,         NELIDA Hamilton CNP

## 2019-04-23 NOTE — PROGRESS NOTES
[facility]  DEREK MACIAS [facility] [address] [facility]  [address] [facility] [address] [address]  Dept: [phone]    4/23/2019    CHIEF COMPLAINT    Chief Complaint   Patient presents with    Annual Exam     HPI    Afshan Escalante is a 63 y.o. male who presents   Chief Complaint   Patient presents with    Annual Exam     Here for routine physical.     Cough and SOB improve- Still taking Brillenta per cardiology, however he is planning on calling to have it changed due to cost. He wasn't able to take the acid reflux medication due to insurance problems, but the albuterol inhaler was helpful. He reports that he has not needed to use the Albuterol recently, but he used it a couple of times and felt it was helpful. Vitals:    04/23/19 1612   BP: 136/80   Pulse: 80   Weight: 172 lb (78 kg)     REVIEW OF SYSTEMS    Review of Systems   Constitutional: Negative. Negative for chills and fever. HENT: Negative. Eyes: Negative. Negative for visual disturbance (Blurred vision improved with new glasses). Respiratory: Negative. Negative for cough and shortness of breath. See HPI    Cardiovascular: Negative. Negative for chest pain, palpitations and leg swelling. Gastrointestinal: Negative. Negative for abdominal pain, blood in stool, constipation, diarrhea, nausea and vomiting. Genitourinary: Negative. Negative for difficulty urinating, flank pain and hematuria. Musculoskeletal: Negative. Negative for back pain. Skin: Negative. Negative for rash. Neurological: Negative. Negative for dizziness and headaches. Hematological: Negative. Negative for adenopathy. Psychiatric/Behavioral: Negative. Negative for dysphoric mood. The patient is not nervous/anxious.       PAST MEDICAL HISTORY    Past Medical History:   Diagnosis Date    Alcohol abuse     Allergic rhinitis     CAD (coronary artery disease) 2018    Chronic back pain     GERD (gastroesophageal reflux disease)     Hypertension     Osteoarthritis        FAMILY HISTORY    Family History   Problem Relation Age of Onset    Heart Disease Father     Heart Disease Mother     Heart Disease Brother     Heart Disease Sister     Other Sister 47        cabg       SOCIAL HISTORY    Social History     Socioeconomic History    Marital status:      Spouse name: None    Number of children: None    Years of education: None    Highest education level: None   Occupational History    None   Social Needs    Financial resource strain: None    Food insecurity:     Worry: None     Inability: None    Transportation needs:     Medical: None     Non-medical: None   Tobacco Use    Smoking status: Former Smoker     Packs/day: 0.00     Years: 15.00     Pack years: 0.00     Last attempt to quit: 2006     Years since quittin.3    Smokeless tobacco: Never Used   Substance and Sexual Activity    Alcohol use:  Yes     Alcohol/week: 12.6 oz     Types: 21 Cans of beer per week     Comment: 4-5 beers daily    Drug use: No    Sexual activity: Yes     Partners: Female   Lifestyle    Physical activity:     Days per week: None     Minutes per session: None    Stress: None   Relationships    Social connections:     Talks on phone: None     Gets together: None     Attends Taoist service: None     Active member of club or organization: None     Attends meetings of clubs or organizations: None     Relationship status: None    Intimate partner violence:     Fear of current or ex partner: None     Emotionally abused: None     Physically abused: None     Forced sexual activity: None   Other Topics Concern    None   Social History Narrative    None       SURGICAL HISTORY    Past Surgical History:   Procedure Laterality Date    ANKLE SURGERY Left     APPENDECTOMY      COLONOSCOPY      CORONARY ANGIOPLASTY WITH STENT PLACEMENT  2018    KNEE SURGERY Left     patella     CURRENT MEDICATIONS    Current Outpatient Medications   Medication Sig Dispense Refill    lansoprazole (PREVACID) 30 MG delayed release capsule Take 1 capsule by mouth daily 30 capsule 0    losartan (COZAAR) 25 MG tablet Take 1 tablet by mouth daily 90 tablet 1    nitroGLYCERIN (NITROSTAT) 0.4 MG SL tablet Place 1 tablet under the tongue every 5 minutes as needed for Chest pain up to max of 3 total doses. If no relief after 1 dose, call 911. 25 tablet 5    ticagrelor (BRILINTA) 90 MG TABS tablet Take 90 mg by mouth      aspirin 81 MG chewable tablet Take 81 mg by mouth      atorvastatin (LIPITOR) 40 MG tablet Take 40 mg by mouth daily      omeprazole (PRILOSEC) 20 MG delayed release capsule Take 1 capsule by mouth every morning (before breakfast) 90 capsule 1    albuterol sulfate HFA (PROVENTIL HFA) 108 (90 Base) MCG/ACT inhaler Inhale 2 puffs into the lungs every 6 hours as needed for Wheezing 1 Inhaler 3    ibuprofen (ADVIL;MOTRIN) 800 MG tablet Take 1 tablet by mouth every 8 hours as needed for Pain 90 tablet 0     No current facility-administered medications for this visit. ALLERGIES    No Known Allergies    PHYSICAL EXAM   Physical Exam   Constitutional: He is oriented to person, place, and time. Vital signs are normal. He appears well-developed and well-nourished. He is cooperative. No distress. HENT:   Head: Normocephalic. Right Ear: Hearing, tympanic membrane, external ear and ear canal normal.   Left Ear: Hearing, tympanic membrane, external ear and ear canal normal.   Nose: Nose normal. No mucosal edema. Mouth/Throat: Oropharynx is clear and moist and mucous membranes are normal.   Eyes: Pupils are equal, round, and reactive to light. Conjunctivae are normal. Right eye exhibits no discharge. Left eye exhibits no discharge. Neck: Neck supple. No thyromegaly present. Cardiovascular: Normal rate, regular rhythm, S1 normal, S2 normal and normal heart sounds. No murmur heard. Negative for BLE swelling.     Pulmonary/Chest: Effort normal and breath sounds normal. No respiratory distress. He has no wheezes. Abdominal: Soft. He exhibits no distension, no fluid wave and no mass. There is no hepatosplenomegaly or splenomegaly. There is no tenderness. There is no rigidity and no guarding. No hernia. Lymphadenopathy:     He has no cervical adenopathy. Neurological: He is alert and oriented to person, place, and time. Skin: Skin is warm and dry. No rash noted. He is not diaphoretic. No erythema. Psychiatric: He has a normal mood and affect. His behavior is normal.   Nursing note and vitals reviewed. Assessment/PLan  1. Encounter for annual physical exam      2. Essential hypertension  3. Coronary artery disease involving native coronary artery of native heart without angina pectoris  - Comprehensive Metabolic Panel; Future  - Magnesium; Future  - CBC With Auto Differential; Future  -Chronic, stable, continue current medications and following with cardiology as advised. 4. Family hx of prostate cancer    - PSA Screening; Future    5. Prostate cancer screening    - PSA Screening; Future    6. Screening for metabolic disorder    - TSH With Reflex Ft4; Future    7. Mixed hyperlipidemia    - Lipid Panel; Future  -Chronic, stable, continue current medications at this time. Rae Hobbs received counseling on the following healthy behaviors: nutrition, exercise and medication adherence  Reviewed prior labs and health maintenance  Continue current medications, diet and exercise. Discussed use, benefit, and side effects of prescribed medications. Barriers to medication compliance addressed. Patient given educational materials - see patient instructions  Was a self-tracking handout given in paper form or via [employer]? No:     Requested Prescriptions      No prescriptions requested or ordered in this encounter       All patient questions answered. Patient voiced understanding. Quality Measures    Body mass index is 24.68 kg/m². Normal. Weight control planned discussed Healthy diet and regular exercise. BP: 136/80 Blood pressure is normal. Treatment plan consists of No treatment change needed.     No results found for: LDLCALC, LDLCHOLESTEROL, LDLDIRECT (goal LDL reduction with dx if diabetes is 50% LDL reduction)      PHQ Scores 4/5/2019 5/4/2018 2/16/2017   PHQ2 Score 0 0 0   PHQ9 Score 0 0 0     Interpretation of Total Score Depression Severity: 1-4 = Minimal depression, 5-9 = Mild depression, 10-14 = Moderate depression, 15-19 = Moderately severe depression, 20-27 = Severe depression     Return in about 1 year (around 4/23/2020) for Physical.    Electronically signed by NELIDA Grant CNP on 4/23/19 at 4:22 PM

## 2019-04-26 LAB
ALBUMIN SERPL-MCNC: NORMAL G/DL
ALP BLD-CCNC: NORMAL U/L
ALT SERPL-CCNC: NORMAL U/L
ANION GAP SERPL CALCULATED.3IONS-SCNC: NORMAL MMOL/L
AST SERPL-CCNC: NORMAL U/L
BASOPHILS ABSOLUTE: NORMAL /ΜL
BASOPHILS RELATIVE PERCENT: NORMAL %
BILIRUB SERPL-MCNC: NORMAL MG/DL (ref 0.1–1.4)
BUN BLDV-MCNC: NORMAL MG/DL
CALCIUM SERPL-MCNC: NORMAL MG/DL
CHLORIDE BLD-SCNC: NORMAL MMOL/L
CHOLESTEROL, TOTAL: NORMAL MG/DL
CHOLESTEROL/HDL RATIO: NORMAL
CO2: NORMAL MMOL/L
CREAT SERPL-MCNC: NORMAL MG/DL
EOSINOPHILS ABSOLUTE: NORMAL /ΜL
EOSINOPHILS RELATIVE PERCENT: NORMAL %
GFR CALCULATED: NORMAL
GLUCOSE BLD-MCNC: NORMAL MG/DL
HCT VFR BLD CALC: NORMAL % (ref 41–53)
HDLC SERPL-MCNC: NORMAL MG/DL (ref 35–70)
HEMOGLOBIN: NORMAL G/DL (ref 13.5–17.5)
LDL CHOLESTEROL CALCULATED: NORMAL MG/DL (ref 0–160)
LYMPHOCYTES ABSOLUTE: NORMAL /ΜL
LYMPHOCYTES RELATIVE PERCENT: NORMAL %
MAGNESIUM: NORMAL MG/DL
MCH RBC QN AUTO: NORMAL PG
MCHC RBC AUTO-ENTMCNC: NORMAL G/DL
MCV RBC AUTO: NORMAL FL
MONOCYTES ABSOLUTE: NORMAL /ΜL
MONOCYTES RELATIVE PERCENT: NORMAL %
NEUTROPHILS ABSOLUTE: NORMAL /ΜL
NEUTROPHILS RELATIVE PERCENT: NORMAL %
PDW BLD-RTO: NORMAL %
PLATELET # BLD: NORMAL K/ΜL
PMV BLD AUTO: NORMAL FL
POTASSIUM SERPL-SCNC: NORMAL MMOL/L
PROSTATE SPECIFIC ANTIGEN: NORMAL NG/ML
RBC # BLD: NORMAL 10^6/ΜL
SODIUM BLD-SCNC: NORMAL MMOL/L
TOTAL PROTEIN: NORMAL
TRIGL SERPL-MCNC: NORMAL MG/DL
TSH SERPL DL<=0.05 MIU/L-ACNC: NORMAL UIU/ML
VLDLC SERPL CALC-MCNC: NORMAL MG/DL
WBC # BLD: NORMAL 10^3/ML

## 2019-04-30 RX ORDER — RANITIDINE 150 MG/1
150 TABLET ORAL 2 TIMES DAILY PRN
Qty: 60 TABLET | Refills: 3 | Status: SHIPPED | OUTPATIENT
Start: 2019-04-30 | End: 2020-06-18 | Stop reason: ALTCHOICE

## 2019-04-30 NOTE — TELEPHONE ENCOUNTER
Please call patient and let him know if he feels like he hasn't been having any problems with acid reflux he doesn't have to take anything. We tried several medications in that class and the insurance wasn't covering any of them. Does he want something for acid reflux that he could take as needed? If so confirm pharmacy and we can send in Zantac.

## 2019-04-30 NOTE — TELEPHONE ENCOUNTER
Pt stated insurance does not cover Lansoprazole pt stated his stomach is just fine does he need to take this medication and if he does he is asking for a different med. Pt stated Sandra Sheppard prescribed this call pt at [phone]  Health Maintenance   Topic Date Due    Pneumococcal 0-64 years Vaccine (1 of 1 - PPSV23) 10/26/1967    Shingles Vaccine (1 of 2) 10/26/2011    Potassium monitoring  04/24/2019    Creatinine monitoring  04/24/2019    Colon cancer screen colonoscopy  09/14/2020    Lipid screen  03/29/2021    DTaP/Tdap/Td vaccine (2 - Td) 09/08/2024    Flu vaccine  Completed    Hepatitis C screen  Completed    HIV screen  Completed             (applicable per patient's age: Cancer Screenings, Depression Screening, Fall Risk Screening, Immunizations)    AST (U/L)   Date Value   03/28/2017 21     ALT (U/L)   Date Value   03/28/2017 28     BUN (mg/dL)   Date Value   03/28/2017 15      (goal A1C is < 7)   (goal LDL is <100) need 30-50% reduction from baseline     BP Readings from Last 3 Encounters:   04/23/19 136/80   04/05/19 (!) 148/86   08/03/18 130/82    (goal /80)      All Future Testing planned in [facility]:  Lab Frequency Next Occurrence   Comprehensive Metabolic Panel Once 12/06/6760   Lipid Panel Once 04/23/2019   PSA Screening Once 04/23/2019   TSH With Reflex Ft4 Once 04/23/2019   Magnesium Once 04/23/2019   CBC With Auto Differential Once 04/23/2019       Next Visit Date:  No future appointments.          Patient Active Problem List:     Osteoarthritis     Chronic back pain     Osteoarthritis of cervical spine     Essential hypertension     GERD (gastroesophageal reflux disease)     CAD (coronary artery disease)

## 2019-05-03 ENCOUNTER — TELEPHONE (OUTPATIENT)
Dept: FAMILY MEDICINE CLINIC | Age: 59
End: 2019-05-03

## 2019-05-03 DIAGNOSIS — E78.2 MIXED HYPERLIPIDEMIA: ICD-10-CM

## 2019-05-03 DIAGNOSIS — Z13.228 SCREENING FOR METABOLIC DISORDER: ICD-10-CM

## 2019-05-03 DIAGNOSIS — I25.10 CORONARY ARTERY DISEASE INVOLVING NATIVE CORONARY ARTERY OF NATIVE HEART WITHOUT ANGINA PECTORIS: ICD-10-CM

## 2019-05-03 DIAGNOSIS — Z12.5 PROSTATE CANCER SCREENING: ICD-10-CM

## 2019-05-03 DIAGNOSIS — I10 ESSENTIAL HYPERTENSION: ICD-10-CM

## 2019-05-03 DIAGNOSIS — Z80.42 FAMILY HX OF PROSTATE CANCER: ICD-10-CM

## 2019-05-03 DIAGNOSIS — R97.20 ELEVATED PSA: Primary | ICD-10-CM

## 2019-05-03 NOTE — TELEPHONE ENCOUNTER
Pt called back to ask for that urology referral to Dr. Tena Hylton at [facility] clinic and fax to him, pt has the number and will call to see if he can make the appt now

## 2019-05-03 NOTE — TELEPHONE ENCOUNTER
Please call patient and let him know that his prostate labs are elevated and we need him to see a urologist. Is there someone he has seen in the past or he would like to see? If so please pend referral. If not please provide patient with Dr. Yary Norton information listed below     [address] John Tejada MD   [address] [place] [address] Dolores Padillastellagayle   [place], [address] [place] [address]   [phone]  **Please tell patient that this doc has an office in [facility] also.

## 2019-09-21 DIAGNOSIS — I10 ESSENTIAL HYPERTENSION: ICD-10-CM

## 2019-09-22 RX ORDER — LOSARTAN POTASSIUM 25 MG/1
TABLET ORAL
Qty: 90 TABLET | Refills: 1 | Status: SHIPPED | OUTPATIENT
Start: 2019-09-22 | End: 2020-03-26

## 2020-01-16 ENCOUNTER — TELEPHONE (OUTPATIENT)
Dept: FAMILY MEDICINE CLINIC | Age: 60
End: 2020-01-16

## 2020-01-23 RX ORDER — GUAIFENESIN AND CODEINE PHOSPHATE 100; 10 MG/5ML; MG/5ML
5 SOLUTION ORAL EVERY 4 HOURS PRN
Qty: 180 ML | Refills: 0 | Status: SHIPPED | OUTPATIENT
Start: 2020-01-23 | End: 2020-12-17 | Stop reason: SDUPTHER

## 2020-01-23 NOTE — TELEPHONE ENCOUNTER
Pt is requesting cough syrup
Pt states that he has been taking both dayquil, nyquil. He has cough with SOB, asking if you would send him a liquid cough medicine.    [facility]
RX sent inform pt
CT: distended edematous gallbladder with stone in neck

## 2020-01-31 ENCOUNTER — TELEPHONE (OUTPATIENT)
Dept: FAMILY MEDICINE CLINIC | Age: 60
End: 2020-01-31

## 2020-03-20 ENCOUNTER — TELEPHONE (OUTPATIENT)
Dept: FAMILY MEDICINE CLINIC | Age: 60
End: 2020-03-20

## 2020-03-20 NOTE — TELEPHONE ENCOUNTER
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 If he feels he is at risk he can take off but it should be discussed with his employer.

## 2020-03-20 NOTE — TELEPHONE ENCOUNTER
PLEASE ADVISE STAFF POOL. PATIENT STATES HE WORKS FOR THE [employer] AND HAS BEEN TAKEN OFF WORK DUE TO HIS MEDICAL HEART CONDITION. DUE TO HIGH RISK TO THE COVID-19 . 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

## 2020-03-26 RX ORDER — LOSARTAN POTASSIUM 25 MG/1
TABLET ORAL
Qty: 90 TABLET | Refills: 1 | Status: SHIPPED | OUTPATIENT
Start: 2020-03-26 | End: 2020-10-19

## 2020-06-18 ENCOUNTER — OFFICE VISIT (OUTPATIENT)
Dept: FAMILY MEDICINE CLINIC | Age: 60
End: 2020-06-18
Payer: COMMERCIAL

## 2020-06-18 VITALS
DIASTOLIC BLOOD PRESSURE: 68 MMHG | SYSTOLIC BLOOD PRESSURE: 100 MMHG | WEIGHT: 175 LBS | BODY MASS INDEX: 25.05 KG/M2 | TEMPERATURE: 97.4 F | HEIGHT: 70 IN

## 2020-06-18 PROCEDURE — 99213 OFFICE O/P EST LOW 20 MIN: CPT | Performed by: FAMILY MEDICINE

## 2020-06-18 RX ORDER — TRAMADOL HYDROCHLORIDE 50 MG/1
50 TABLET ORAL EVERY 6 HOURS PRN
Qty: 28 TABLET | Refills: 0 | Status: SHIPPED | OUTPATIENT
Start: 2020-06-18 | End: 2020-06-25

## 2020-06-18 ASSESSMENT — ENCOUNTER SYMPTOMS
ALLERGIC/IMMUNOLOGIC NEGATIVE: 1
ABDOMINAL PAIN: 0
EYES NEGATIVE: 1
COUGH: 0
SHORTNESS OF BREATH: 0

## 2020-07-06 ENCOUNTER — OFFICE VISIT (OUTPATIENT)
Dept: ORTHOPEDIC SURGERY | Age: 60
End: 2020-07-06
Payer: COMMERCIAL

## 2020-07-06 VITALS
WEIGHT: 175.04 LBS | HEART RATE: 59 BPM | BODY MASS INDEX: 25.06 KG/M2 | HEIGHT: 70 IN | SYSTOLIC BLOOD PRESSURE: 151 MMHG | DIASTOLIC BLOOD PRESSURE: 89 MMHG

## 2020-07-06 PROCEDURE — 20611 DRAIN/INJ JOINT/BURSA W/US: CPT | Performed by: ORTHOPAEDIC SURGERY

## 2020-07-06 PROCEDURE — 99214 OFFICE O/P EST MOD 30 MIN: CPT | Performed by: ORTHOPAEDIC SURGERY

## 2020-07-06 PROCEDURE — 20605 DRAIN/INJ JOINT/BURSA W/O US: CPT | Performed by: ORTHOPAEDIC SURGERY

## 2020-07-06 RX ORDER — LIDOCAINE HYDROCHLORIDE 10 MG/ML
4 INJECTION, SOLUTION INFILTRATION; PERINEURAL ONCE
Status: COMPLETED | OUTPATIENT
Start: 2020-07-06 | End: 2020-07-06

## 2020-07-06 RX ORDER — LIDOCAINE HYDROCHLORIDE 10 MG/ML
4 INJECTION, SOLUTION INFILTRATION; PERINEURAL ONCE
Status: COMPLETED | OUTPATIENT
Start: 2020-07-06 | End: 2020-07-08

## 2020-07-06 RX ORDER — METHYLPREDNISOLONE ACETATE 40 MG/ML
40 INJECTION, SUSPENSION INTRA-ARTICULAR; INTRALESIONAL; INTRAMUSCULAR; SOFT TISSUE ONCE
Status: COMPLETED | OUTPATIENT
Start: 2020-07-06 | End: 2020-07-06

## 2020-07-06 RX ORDER — METHYLPREDNISOLONE ACETATE 40 MG/ML
40 INJECTION, SUSPENSION INTRA-ARTICULAR; INTRALESIONAL; INTRAMUSCULAR; SOFT TISSUE ONCE
Status: COMPLETED | OUTPATIENT
Start: 2020-07-06 | End: 2020-07-08

## 2020-07-06 RX ADMIN — LIDOCAINE HYDROCHLORIDE 4 ML: 10 INJECTION, SOLUTION INFILTRATION; PERINEURAL at 09:00

## 2020-07-06 RX ADMIN — METHYLPREDNISOLONE ACETATE 40 MG: 40 INJECTION, SUSPENSION INTRA-ARTICULAR; INTRALESIONAL; INTRAMUSCULAR; SOFT TISSUE at 09:01

## 2020-07-06 ASSESSMENT — ENCOUNTER SYMPTOMS
SHORTNESS OF BREATH: 0
DIARRHEA: 0
COLOR CHANGE: 0
NAUSEA: 0
CONSTIPATION: 0
VOMITING: 0
CHEST TIGHTNESS: 0
ABDOMINAL PAIN: 0
APNEA: 0
RESPIRATORY NEGATIVE: 1
ABDOMINAL DISTENTION: 0
COUGH: 0

## 2020-07-06 NOTE — PATIENT INSTRUCTIONS
The injection site should never get red, hot, or swollen and if it does the patient will contact our office right away. The patient may experience a increase in soreness the first 24-48 hours due to a cortisone flair and can take anti-inflammatories for a short period of time to reduce that soreness. The patient should not submerge the injection site in water for a minimum of 24 hours to avoid infection. This means no lakes, pools, ponds, or hot tubs for 24 hours. If the patient is diabetic the injection may increase their blood sugar for up to one week. The patient can do this cortisone injection once every 3 months as needed. Patient Education        Tennis Elbow: Exercises  Introduction  Here are some examples of exercises for you to try. The exercises may be suggested for a condition or for rehabilitation. Start each exercise slowly. Ease off the exercises if you start to have pain. You will be told when to start these exercises and which ones will work best for you. How to do the exercises  Wrist flexor stretch   1. Extend your arm in front of you with your palm up. 2. Bend your wrist, pointing your hand toward the floor. 3. With your other hand, gently bend your wrist farther until you feel a mild to moderate stretch in your forearm. 4. Hold for at least 15 to 30 seconds. Repeat 2 to 4 times. Wrist extensor stretch   1. Repeat steps 1 to 4 of the stretch above but begin with your extended hand palm down. Ball or sock squeeze   1. Hold a tennis ball (or a rolled-up sock) in your hand. 2. Make a fist around the ball (or sock) and squeeze. 3. Hold for about 6 seconds, and then relax for up to 10 seconds. 4. Repeat 8 to 12 times. 5. Switch the ball (or sock) to your other hand and do 8 to 12 times. Wrist deviation   1. Sit so that your arm is supported but your hand hangs off the edge of a flat surface, such as a table.   2. Hold your hand out like you are shaking hands with someone. 3. Move your hand up and down. 4. Repeat this motion 8 to 12 times. 5. Switch arms. 6. Try to do this exercise twice with each hand. Wrist curls   1. Place your forearm on a table with your hand hanging over the edge of the table, palm up. 2. Place a 1- to 2-pound weight in your hand. This may be a dumbbell, a can of food, or a filled water bottle. 3. Slowly raise and lower the weight while keeping your forearm on the table and your palm facing up. 4. Repeat this motion 8 to 12 times. 5. Switch arms, and do steps 1 through 4.  6. Repeat with your hand facing down toward the floor. Switch arms. Biceps curls   1. Sit leaning forward with your legs slightly spread and your left hand on your left thigh. 2. Place your right elbow on your right thigh, and hold the weight with your forearm horizontal.  3. Slowly curl the weight up and toward your chest.  4. Repeat this motion 8 to 12 times. 5. Switch arms, and do steps 1 through 4. Follow-up care is a key part of your treatment and safety. Be sure to make and go to all appointments, and call your doctor if you are having problems. It's also a good idea to know your test results and keep a list of the medicines you take. Where can you learn more? Go to https://[employer] Performance[employer]ROM[employer]omeb[email or web address] org and sign in to your [employer] account. Enter L182 in the Emtrics box to learn more about \"Tennis Elbow: Exercises. \"     If you do not have an account, please click on the \"Sign Up Now\" link. Current as of: March 2, 2020               Content Version: 12.5  © 6341-2727 Healthwise, Incorporated. Care instructions adapted under license by [facility] ([facility]). If you have questions about a medical condition or this instruction, always ask your healthcare professional. [address] any warranty or liability for your use of this information.        Patient Education        Quadricep Muscle Strain: Rehab safety. Be sure to make and go to all appointments, and call your doctor if you are having problems. It's also a good idea to know your test results and keep a list of the medicines you take. Where can you learn more? Go to https://chjoséeb.[employer]u. org and sign in to your [employer] account. Enter D888 in the Darberry box to learn more about \"Quadricep Muscle Strain: Rehab Exercises. \"     If you do not have an account, please click on the \"Sign Up Now\" link. Current as of: March 2, 2020               Content Version: 12.5  © 5765-8332 Healthwise, Incorporated. Care instructions adapted under license by [facility] ([facility]). If you have questions about a medical condition or this instruction, always ask your healthcare professional. [address] any warranty or liability for your use of this information.

## 2020-07-06 NOTE — PROGRESS NOTES
[facility]  [address]  [facility] [address]  Dept: [phone]  Dept Fax: [phone]          Left Knee/Left elbow - New Patient     Subjective:     Chief Complaint   Patient presents with    Knee Pain     left knee pain    Elbow Pain     left elbow pain     HPI:     Kirk Kc presents today for Left knee pain. Occupation: [address]  and for the [employer] [employer]. The pain has been present for 4 months. The patient recalls a no specific injury. The patient has tried Tramadol and aspirin with no improvement. The pain is now described as Stabbing  and Sharp and dull and achey. There is  pain on weight bearing. The knee has swelled. There is not painful popping and clicking. The knee has caught or locked up. The knee has not given out. It is  stiff upon arising from sitting. It is  painful to go up and down stairs and sit for a prolonged time. The patient has had a cortisone injection pm 8/13/2018 with good pain relief. The patient has not tried a lubrication injection. The patient has not tried physical therapy. The patient has had surgery approximately 25 years ago in [place] for patella dislocation. He did see Dr. Annabella Gomez for his left knee pain and got a cortisone injection. He can not take NSAIDs because of his heart stents. He is also complaining of left elbow pain for the last 2 months on and off. He states he has some weakness in his hands. He states his hands are stiff in the morning and painful to open and close. He also feels his hands swells. They seem to get better after a couple of hours. He states he does not have any numbness or tingling in her hands and fists. ROS:   Review of Systems   Constitutional: Positive for activity change. Negative for appetite change, fatigue and fever. Respiratory: Negative. Negative for apnea, cough, chest tightness and shortness of breath. Cardiovascular: Negative. Negative for chest pain, palpitations and leg swelling. Gastrointestinal: Negative for abdominal distention, abdominal pain, constipation, diarrhea, nausea and vomiting. Genitourinary: Negative for difficulty urinating, dysuria and hematuria. Musculoskeletal: Positive for arthralgias, gait problem and joint swelling. Negative for myalgias. Skin: Negative for color change and rash. Neurological: Positive for weakness. Negative for dizziness, numbness and headaches. Psychiatric/Behavioral: Negative for sleep disturbance. Past Medical History:    Past Medical History:   Diagnosis Date    Alcohol abuse     Allergic rhinitis     CAD (coronary artery disease) 2018    Chronic back pain     GERD (gastroesophageal reflux disease)     Hypertension     Osteoarthritis        Past Surgical History:    Past Surgical History:   Procedure Laterality Date    ANKLE SURGERY Left     APPENDECTOMY      COLONOSCOPY      CORONARY ANGIOPLASTY WITH STENT PLACEMENT  04/2018    KNEE SURGERY Left     patella       CurrentMedications:   Current Outpatient Medications   Medication Sig Dispense Refill    diclofenac sodium (VOLTAREN) 1 % GEL Apply 4 g topically 4 times daily 2 Tube 5    losartan (COZAAR) 25 MG tablet TAKE 1 TABLET BY MOUTH EVERY DAY 90 tablet 1    atorvastatin (LIPITOR) 40 MG tablet Take 40 mg by mouth daily      ibuprofen (ADVIL;MOTRIN) 800 MG tablet Take 1 tablet by mouth every 8 hours as needed for Pain 90 tablet 0    aspirin 81 MG chewable tablet Take 81 mg by mouth       No current facility-administered medications for this visit. Allergies:    Patient has no known allergies.     Social History:   Social History     Socioeconomic History    Marital status:      Spouse name: None    Number of children: None    Years of education: None    Highest education level: None   Occupational History    None   Social Needs    Financial resource strain: None    Food insecurity     Worry: None     Inability: None    Transportation needs     Medical: None     Non-medical: None   Tobacco Use    Smoking status: Former Smoker     Packs/day: 0.00     Years: 15.00     Pack years: 0.00     Last attempt to quit: 2006     Years since quittin.5    Smokeless tobacco: Never Used   Substance and Sexual Activity    Alcohol use: Yes     Alcohol/week: 21.0 standard drinks     Types: 21 Cans of beer per week     Comment: 4-5 beers daily    Drug use: No    Sexual activity: Yes     Partners: Female   Lifestyle    Physical activity     Days per week: None     Minutes per session: None    Stress: None   Relationships    Social connections     Talks on phone: None     Gets together: None     Attends Mor[employer] service: None     Active member of club or organization: None     Attends meetings of clubs or organizations: None     Relationship status: None    Intimate partner violence     Fear of current or ex partner: None     Emotionally abused: None     Physically abused: None     Forced sexual activity: None   Other Topics Concern    None   Social History Narrative    None       Family History:  Family History   Problem Relation Age of Onset    Heart Disease Father     Heart Disease Mother     Heart Disease Brother     Heart Disease Sister     Other Sister 47        cabg       Vitals:   BP (!) 151/89   Pulse 59   Ht 5' 10\" (1.778 m)   Wt 175 lb 0.7 oz (79.4 kg)   BMI 25.12 kg/m²  Body mass index is 25.12 kg/m². Physical Examination:     Orthopedics:    GENERAL: Alert and oriented X3 in no acute distress. SKIN: Intact without lesions or ulcerations. NEURO: Intact to sensory and motor testing. VASC: Capillary refill is less than 3 seconds. KNEE EXAM    LOCATION: Left Knee  GEN: Alert and oriented X 3, in no acute distress. GAIT: The patient's gait was observed while entering the exam room and was noted to be non antalgic.  The extremity is in anatomic alignment. SKIN: Intact without rashes, lesions, or ulcerations. No obvious deformity or swelling. NEURO: The patient responds to light touch throughout bilateral LE. Patellar and Achilles reflexes are 2/4. VASC: The bilateral LE is neurovascularly intact with 2/4 DP and 2/4 PT pulses. Brisk capillary refill. ROM: 0/145 degrees. There is no effusion. MUSC: decreased quad tone  LIGAMENT: Lachman's test is Negative with Good endpoint. Anterior drawer Negative. Posterior drawer Negative. There is 1+ varus instability at 0 degrees and 1+ varus instability at 30 degrees. There is No valgus instability at 0 degrees and No valgus instability at 30 degrees. SPECIAL: Susanne test is negative with no clunks, + crepitation, and + pain. PALP: There is lateral joint line pain. Posterior lateral hamstring pain. ELBOW EXAM    Location: Left Elbow  Gen: Intact without rashes, lesions, or ulcerations. Vasc: Cap refill is less than 3 seconds. Neuro: Radial, ulnar, and median nerves are intact to sensory and motor testing. Bicep, tricep, and brachioradialis reflexes are +2/4. Inspection: The patient is tender to palpation over the lateral metatarsal.  There is no effusion. No obvious deformity of the elbow. ROM: 140 degrees of flexion, 0 degrees of extension, 90 degrees of supination, 90 degrees of pronation. Musc: Strength is 5/5 in flexion, 5/5 extension, 5/5 supination and  5/5 pronation. Test: Ligamentous exam is stable to varus and valgus stress testing at 0 and 30 degrees. negative Tinel's sign at elbow. + pain on resisted wrist extension. Assessment:     1. Lateral epicondylitis of left elbow    2. Primary osteoarthritis of left knee      Procedures:    Procedure: yes    Regular Knee Injection    Location: Left knee  Procedure: Corticosteroid injection into the knee. the patient was placed in the supine position on the exam table. The superior lateral portal was identified and marked.  the skin was prepped with betadine in a sterile fashion. Utilizing [employer] ultrasound unit with a variable frequency linear transducer was used for precise placement and clean technique with sterile gloves a 5cc solution containing 4cc of 1.0% Lidocaine with 1cc containing 40mg of Depo-medrol was injected. There was no resistance to the injection. The wound was cleansed and a band-aid was placed. the patient tolerated the procedure without difficulty. Adverse reactions to the injection were discussed with the patient including signs of infection (increasing pain, redness, swelling) and the patient was instructed to call immediately if experiencing any of these symptoms. Lateral Epicondyle Elbow Injection    Location: Left Elbow  Procedure: The point of maximum tenderness just distal to the lateral epicondyle was identified by palpation and marked with a hollow end of a click type ball point pen. The skin was prepped with a Betadine swab in a sterile fashion. The tendinous insertion was injected under sterile technique with a 5 cc solution containing 4 cc of 1% Lidocaine and 1 cc (40mg) of Depo Medrol. The skin was cleansed and a Band-Aid was placed. The patient tolerated the procedure without difficulty. The patient was told to watch for signs of infection and to call immediately with any problems. Radiology:   KNEE X-RAY    4 views of the left knee including AP, bilateral tunnel, and lateral in the upright position, and skyline views reveal anatomic alignment with no fracture or dislocation. Kellgren grade II/III changes of osteoarthritis (joint space narrowing, osteophyte, subchondral sclerosis, bony deformity/cyst) of the patellofemoral compartment(s). No bony erosion or periosteal reaction. No soft tissue masses. Mild calcification of arteries noted. Possible osseous loose body lateral to the patella. Impression: Mild to moderate arthritic changes of the left knee.     ELBOW X-RAY    Three views of the left elbow were obtained today. This includes AP, Oblique and lateral x-rays. The x-rays of the elbow reveal no acute fractures, dislocations or soft tissue or bony abnormalities present. Impression: Negative radiographs of the left elbow. Plan:   Treatment : I reviewed the X-ray with the patient and I informed them that the patient has some arthritic changes of his left knee. His elbow x-ray is negative. We discussed the etiologies and natural histories of left elbow lateral epicondylitis and mild to moderate arthritic changes of the left knee. We discussed the various treatment alternatives including anti-inflammatory medications, physical therapy, injections, further imaging studies and as a last result surgery. During today's visit, we discussed surgical versus conservative options for his left knee. If his knee ever begins to catch and lock we may need to consider surgical interventions for the the possible loose body near his patella. Due to his multiple other health conditions, he is not interested in having any surgeries at this time and would prefer conservative treatments. He is unable to take NSAIDs due to his cardiac stents. The quickest thing we can do today is cortisone injections into his left elbow and his left knee. The patient has opted for a cortisone injection into the left knee and left lateral epicondylitis of his elbow to help reduce inflammation and pain. The injection site should never get red, hot, or swollen and if it does the patient will contact our office right away. The patient may experience a increase in soreness the first 24-48 hours due to a cortisone flair and can take anti-inflammatories for a short period of time to reduce that soreness. The patient should not submerge the injection site in water for a minimum of 24 hours to avoid infection. This means no lakes, pools, ponds, or hot tubs for 24 hours.  If the patient is diabetic the injection may increase their blood sugar for up to one week. The patient can do this cortisone injection once every 3 months as needed. If the injections stop working and do not give the patient relief the patient should consider surgical interventions to produce long term relief. Eventually, we may need to get an MRI of his left knee if the cortisone injection does not work. He does have weakness of his left quadricep and has significant hamstring tightness and I believe physical therapy would be helpful. A physical therapy prescription was given. A Rx was not given. Patient should return to the clinic in 6 weeks to follow up with  Geovanny Browne PA-C. The patient will call the office immediately with any problems. No orders of the defined types were placed in this encounter. Orders Placed This Encounter   Procedures    XR ELBOW LEFT (MIN 3 VIEWS)     Standing Status:   Future     Number of Occurrences:   1     Standing Expiration Date:   7/6/2021     Order Specific Question:   Reason for exam:     Answer:   pain   [address] [facility] [place] 2[place]     Referral Priority:   Routine     Referral Type:   Eval and Treat     Referral Reason:   Specialty Services Required     Requested Specialty:   Physical Therapy     Number of Visits Requested:   1       IGeovanny PA-C, am scribing for and in the presence of Ruben Pizarro D.O.. 7/6/2020  9:52 AM      IRuben DO, have personally seen this patient and I have reviewed the CC, PMH, FHX and Social History as provided by other clinical staff. I reassessed the HPI and ROS as scribed by Geovanny Browne PA-C in my presence and it is both accurate and complete. Thereafter, I personally performed the PE, reviewed the imaging and established the DX and POC. I agree with the documentation provided by the Physician Assistant. I have reviewed all documentation in its entirety prior to providing my signature indicating agreement.  Any areas of disagreement are noted on the chart.    Electronically signed by Quinton Douglas DO on 2020 at 4:08 PM        Electronically signed by Hanane Mansfield PA-C, on 2020 at 9:52 AM

## 2020-07-08 RX ADMIN — METHYLPREDNISOLONE ACETATE 40 MG: 40 INJECTION, SUSPENSION INTRA-ARTICULAR; INTRALESIONAL; INTRAMUSCULAR; SOFT TISSUE at 10:30

## 2020-07-08 RX ADMIN — LIDOCAINE HYDROCHLORIDE 4 ML: 10 INJECTION, SOLUTION INFILTRATION; PERINEURAL at 09:00

## 2020-12-17 RX ORDER — GUAIFENESIN AND CODEINE PHOSPHATE 100; 10 MG/5ML; MG/5ML
5 SOLUTION ORAL EVERY 4 HOURS PRN
Qty: 180 ML | Refills: 0 | Status: SHIPPED | OUTPATIENT
Start: 2020-12-17 | End: 2021-12-03 | Stop reason: SDUPTHER

## 2020-12-17 NOTE — TELEPHONE ENCOUNTER
--------------refill-------------------    guaiFENesin-codeine (CHERATUSSIN AC) 100-10 MG/5ML syrup     Used nyquil, but has had cough x 1 week. With runny nose, no fever.     [facility] [address] [phone]

## 2020-12-23 ENCOUNTER — TELEPHONE (OUTPATIENT)
Dept: FAMILY MEDICINE CLINIC | Age: 60
End: 2020-12-23

## 2021-03-26 ENCOUNTER — IMMUNIZATION (OUTPATIENT)
Dept: INTERNAL MEDICINE CLINIC | Age: 61
End: 2021-03-26
Payer: COMMERCIAL

## 2021-03-26 PROCEDURE — 0001A COVID-19, PFIZER VACCINE 30MCG/0.3ML DOSE: CPT | Performed by: INTERNAL MEDICINE

## 2021-03-26 PROCEDURE — 91300 COVID-19, PFIZER VACCINE 30MCG/0.3ML DOSE: CPT | Performed by: INTERNAL MEDICINE

## 2021-04-16 ENCOUNTER — IMMUNIZATION (OUTPATIENT)
Dept: INTERNAL MEDICINE CLINIC | Age: 61
End: 2021-04-16
Payer: COMMERCIAL

## 2021-04-16 PROCEDURE — 0002A COVID-19, PFIZER VACCINE 30MCG/0.3ML DOSE: CPT | Performed by: INTERNAL MEDICINE

## 2021-04-16 PROCEDURE — 91300 COVID-19, PFIZER VACCINE 30MCG/0.3ML DOSE: CPT | Performed by: INTERNAL MEDICINE

## 2021-04-20 DIAGNOSIS — I10 ESSENTIAL HYPERTENSION: ICD-10-CM

## 2021-04-21 RX ORDER — LOSARTAN POTASSIUM 25 MG/1
TABLET ORAL
Qty: 90 TABLET | Refills: 0 | Status: SHIPPED | OUTPATIENT
Start: 2021-04-21 | End: 2021-07-20

## 2021-07-20 DIAGNOSIS — I10 ESSENTIAL HYPERTENSION: ICD-10-CM

## 2021-07-20 RX ORDER — LOSARTAN POTASSIUM 25 MG/1
TABLET ORAL
Qty: 90 TABLET | Refills: 3 | Status: SHIPPED | OUTPATIENT
Start: 2021-07-20 | End: 2022-08-24

## 2021-08-30 ENCOUNTER — OFFICE VISIT (OUTPATIENT)
Dept: FAMILY MEDICINE CLINIC | Age: 61
End: 2021-08-30
Payer: COMMERCIAL

## 2021-08-30 VITALS
HEART RATE: 57 BPM | WEIGHT: 171.8 LBS | OXYGEN SATURATION: 98 % | TEMPERATURE: 97.7 F | BODY MASS INDEX: 24.6 KG/M2 | DIASTOLIC BLOOD PRESSURE: 62 MMHG | SYSTOLIC BLOOD PRESSURE: 130 MMHG | HEIGHT: 70 IN

## 2021-08-30 DIAGNOSIS — L91.0 KELOID OF SKIN: ICD-10-CM

## 2021-08-30 DIAGNOSIS — C61 PROSTATE CANCER (HCC): ICD-10-CM

## 2021-08-30 DIAGNOSIS — E78.1 HIGH TRIGLYCERIDES: ICD-10-CM

## 2021-08-30 DIAGNOSIS — I25.10 CORONARY ARTERY DISEASE INVOLVING NATIVE CORONARY ARTERY OF NATIVE HEART WITHOUT ANGINA PECTORIS: ICD-10-CM

## 2021-08-30 DIAGNOSIS — I10 ESSENTIAL HYPERTENSION: Primary | ICD-10-CM

## 2021-08-30 PROCEDURE — 99214 OFFICE O/P EST MOD 30 MIN: CPT | Performed by: FAMILY MEDICINE

## 2021-08-30 SDOH — ECONOMIC STABILITY: FOOD INSECURITY: WITHIN THE PAST 12 MONTHS, THE FOOD YOU BOUGHT JUST DIDN'T LAST AND YOU DIDN'T HAVE MONEY TO GET MORE.: NEVER TRUE

## 2021-08-30 SDOH — ECONOMIC STABILITY: FOOD INSECURITY: WITHIN THE PAST 12 MONTHS, YOU WORRIED THAT YOUR FOOD WOULD RUN OUT BEFORE YOU GOT MONEY TO BUY MORE.: NEVER TRUE

## 2021-08-30 ASSESSMENT — PATIENT HEALTH QUESTIONNAIRE - PHQ9
SUM OF ALL RESPONSES TO PHQ QUESTIONS 1-9: 0
1. LITTLE INTEREST OR PLEASURE IN DOING THINGS: 0
2. FEELING DOWN, DEPRESSED OR HOPELESS: 0
SUM OF ALL RESPONSES TO PHQ QUESTIONS 1-9: 0
SUM OF ALL RESPONSES TO PHQ QUESTIONS 1-9: 0
SUM OF ALL RESPONSES TO PHQ9 QUESTIONS 1 & 2: 0

## 2021-08-30 ASSESSMENT — SOCIAL DETERMINANTS OF HEALTH (SDOH): HOW HARD IS IT FOR YOU TO PAY FOR THE VERY BASICS LIKE FOOD, HOUSING, MEDICAL CARE, AND HEATING?: VERY HARD

## 2021-08-30 ASSESSMENT — ENCOUNTER SYMPTOMS
ABDOMINAL PAIN: 0
ALLERGIC/IMMUNOLOGIC NEGATIVE: 1
DIARRHEA: 0
SHORTNESS OF BREATH: 0
EYES NEGATIVE: 1
BLOOD IN STOOL: 0
COUGH: 0
CONSTIPATION: 0

## 2021-08-30 NOTE — PROGRESS NOTES
[facility]  [facility]  [address]  [address] [address]  Dept: [phone]    8/30/2021    CHIEF COMPLAINT    Chief Complaint   Patient presents with    Hypertension       HPI    Eliud Anaya is a 62 y.o. male who presents   Chief Complaint   Patient presents with    Hypertension   . Recheck chronic hypertension, hyperlipidemia, coronary artery disease. he has not seen cardiologist for several years. Had stents placed in 2018. He is currently seeing a urologist and radiation therapist for prostate cancer. Had seed implants done in 2019. Hypertension  This is a chronic problem. The current episode started more than 1 year ago. The problem is controlled. Pertinent negatives include no chest pain, headaches or shortness of breath. Risk factors for coronary artery disease include dyslipidemia and male gender. Past treatments include angiotensin blockers. The current treatment provides moderate improvement. Vitals:    08/30/21 1021   BP: 130/62   Pulse: 57   Temp: 97.7 °F (36.5 °C)   SpO2: 98%   Weight: 171 lb 12.8 oz (77.9 kg)   Height: 5' 10\" (1.778 m)       REVIEW OF SYSTEMS    Review of Systems   Constitutional: Negative for fatigue, fever and unexpected weight change. HENT: Negative. Eyes: Negative. Respiratory: Negative for cough and shortness of breath. Cardiovascular: Negative for chest pain and leg swelling. Gastrointestinal: Negative for abdominal pain, blood in stool, constipation and diarrhea. Endocrine: Negative. Genitourinary: Positive for urgency. Negative for frequency. Musculoskeletal: Negative. Skin: Negative. Scar tissue on the right lateral chest wall from prior lesion excision. Allergic/Immunologic: Negative. Neurological: Negative for dizziness and headaches. Hematological: Negative. Psychiatric/Behavioral: Negative for sleep disturbance. The patient is not nervous/anxious. PAST MEDICAL HISTORY    Past Medical History:   Diagnosis Date    Alcohol abuse     Allergic rhinitis     CAD (coronary artery disease) 2018    Chronic back pain     GERD (gastroesophageal reflux disease)     Hypertension     Osteoarthritis     Prostate cancer ([facility] [address] 2019    Dr. Izzy Mcgee, Dr. Robert Rodriguez. radiation and seed implants       FAMILY HISTORY    Family History   Problem Relation Age of Onset    Heart Disease Father     Heart Disease Mother     Heart Disease Brother     Heart Disease Sister     Other Sister 47        cabg       SOCIAL HISTORY    Social History     Socioeconomic History    Marital status:      Spouse name: None    Number of children: None    Years of education: None    Highest education level: None   Occupational History    None   Tobacco Use    Smoking status: Former Smoker     Packs/day: 0.00     Years: 15.00     Pack years: 0.00     Quit date: 1/1/2006     Years since quitting: 15.6    Smokeless tobacco: Never Used   Vaping Use    Vaping Use: Never used   Substance and Sexual Activity    Alcohol use: Yes     Alcohol/week: 21.0 standard drinks     Types: 21 Cans of beer per week     Comment: 4-5 beers daily    Drug use: No    Sexual activity: Yes     Partners: Female   Other Topics Concern    None   Social History Narrative    None     Social Determinants of Health     Financial Resource Strain: High Risk    Difficulty of Paying Living Expenses: Very hard   Food Insecurity: No Food Insecurity    Worried About Running Out of Food in the Last Year: Never true    Yoana of Food in the Last Year: Never true   Transportation Needs:     Lack of Transportation (Medical):      Lack of Transportation (Non-Medical):    Physical Activity:     Days of Exercise per Week:     Minutes of Exercise per Session:    Stress:     Feeling of Stress :    Social Connections:     Frequency of Communication with Friends and Family:     Frequency of Social Gatherings with Friends and Family:     Attends Church Services:     Active Member of Clubs or Organizations:     Attends Club or Organization Meetings:     Marital Status:    Intimate Partner Violence:     Fear of Current or Ex-Partner:     Emotionally Abused:     Physically Abused:     Sexually Abused:        SURGICAL HISTORY    Past Surgical History:   Procedure Laterality Date    ANKLE SURGERY Left     APPENDECTOMY      COLONOSCOPY      CORONARY ANGIOPLASTY WITH STENT PLACEMENT  04/2018    [facility]    KNEE SURGERY Left     patella    PROSTATE SURGERY  2019    seed implants       CURRENT MEDICATIONS    Current Outpatient Medications   Medication Sig Dispense Refill    losartan (COZAAR) 25 MG tablet TAKE 1 TABLET BY MOUTH EVERY DAY 90 tablet 3    aspirin 81 MG chewable tablet Take 81 mg by mouth      atorvastatin (LIPITOR) 40 MG tablet Take 40 mg by mouth daily       No current facility-administered medications for this visit. ALLERGIES    No Known Allergies    PHYSICAL EXAM   Physical Exam  Vitals reviewed. Constitutional:       Appearance: He is well-developed. HENT:      Head: Normocephalic. Eyes:      Conjunctiva/sclera: Conjunctivae normal.      Pupils: Pupils are equal, round, and reactive to light. Neck:      Thyroid: No thyromegaly. Cardiovascular:      Rate and Rhythm: Normal rate and regular rhythm. Heart sounds: Normal heart sounds. No murmur heard. Pulmonary:      Effort: Pulmonary effort is normal.      Breath sounds: Normal breath sounds. No wheezing or rales. Abdominal:      Palpations: Abdomen is soft. Tenderness: There is no abdominal tenderness. There is no guarding or rebound. Musculoskeletal:         General: No tenderness or deformity. Normal range of motion. Cervical back: Normal range of motion and neck supple. Lymphadenopathy:      Cervical: No cervical adenopathy. Skin:     General: Skin is warm and dry.       Comments: 2 cm keloid formation right lateral chest wall   Neurological:      Mental Status: He is alert and oriented to person, place, and time. Psychiatric:         Mood and Affect: Mood normal.         Behavior: Behavior normal.         Thought Content: Thought content normal.         Judgment: Judgment normal.         ASSESSMENT/PLAN  1. Essential hypertension  Chronic and stable on current medication.  - Comprehensive Metabolic Panel; Future    2. Prostate cancer [facility])  Continue seeing specialist.  No current symptoms of concern    3. Coronary artery disease involving native coronary artery of native heart without angina pectoris  Recheck lipids and continue risk modification  - Lipid Panel; Future    4. High triglycerides  As above  - Lipid Panel; Future    5. Keloid of skin  See plastic surgeon for evaluation of keloid scar. [facility] received counseling on the following healthy behaviors: nutrition, exercise and medication adherence  Reviewed prior labs and health maintenance. Continue current medications, diet and exercise. Discussed use, benefit, and side effects of prescribed medications. Barriers to medication compliance addressed. Patient given educational materials - see patient instructions. All patient questions answered. Patient voiced understanding. Return in about 6 months (around 2/28/2022) for htn.         Electronically signed by Eugenie Ramos MD on 8/30/21 at 10:31 AM EDT

## 2021-12-03 ENCOUNTER — TELEPHONE (OUTPATIENT)
Dept: FAMILY MEDICINE CLINIC | Age: 61
End: 2021-12-03

## 2021-12-03 DIAGNOSIS — R05.9 COUGH: ICD-10-CM

## 2021-12-03 DIAGNOSIS — S76.112A QUADRICEPS STRAIN, LEFT, INITIAL ENCOUNTER: ICD-10-CM

## 2021-12-03 RX ORDER — GUAIFENESIN AND CODEINE PHOSPHATE 100; 10 MG/5ML; MG/5ML
5 SOLUTION ORAL EVERY 4 HOURS PRN
Qty: 180 ML | Refills: 0 | Status: SHIPPED | OUTPATIENT
Start: 2021-12-03 | End: 2021-12-09

## 2021-12-03 RX ORDER — IBUPROFEN 800 MG/1
800 TABLET ORAL EVERY 8 HOURS PRN
Qty: 90 TABLET | Refills: 0 | Status: SHIPPED | OUTPATIENT
Start: 2021-12-03 | End: 2022-01-05

## 2021-12-03 NOTE — TELEPHONE ENCOUNTER
Patient stated hes had a cough for 2 weeks no other symptoms and would like cough syrup called in, the pills dont work.  Patient also asking for ibu both pended

## 2021-12-08 PROBLEM — I24.9 ACS (ACUTE CORONARY SYNDROME) (HCC): Status: ACTIVE | Noted: 2018-04-24

## 2021-12-08 PROBLEM — Z95.5 S/P DRUG ELUTING CORONARY STENT PLACEMENT: Status: ACTIVE | Noted: 2018-06-28

## 2021-12-08 PROBLEM — R06.02 SOB (SHORTNESS OF BREATH): Status: ACTIVE | Noted: 2021-12-08

## 2021-12-08 PROBLEM — C61 ADENOCARCINOMA OF PROSTATE (HCC): Status: ACTIVE | Noted: 2019-06-11

## 2021-12-08 PROBLEM — R07.9 CHEST PAIN: Status: ACTIVE | Noted: 2018-04-24

## 2021-12-08 PROBLEM — E78.49 OTHER HYPERLIPIDEMIA: Status: ACTIVE | Noted: 2018-05-04

## 2021-12-09 ENCOUNTER — OFFICE VISIT (OUTPATIENT)
Dept: FAMILY MEDICINE CLINIC | Age: 61
End: 2021-12-09
Payer: COMMERCIAL

## 2021-12-09 VITALS
HEART RATE: 68 BPM | TEMPERATURE: 98.2 F | WEIGHT: 178 LBS | DIASTOLIC BLOOD PRESSURE: 66 MMHG | RESPIRATION RATE: 16 BRPM | HEIGHT: 70 IN | SYSTOLIC BLOOD PRESSURE: 132 MMHG | BODY MASS INDEX: 25.48 KG/M2 | OXYGEN SATURATION: 96 %

## 2021-12-09 DIAGNOSIS — C61 PROSTATE CANCER (HCC): ICD-10-CM

## 2021-12-09 DIAGNOSIS — M54.50 CHRONIC MIDLINE LOW BACK PAIN WITHOUT SCIATICA: ICD-10-CM

## 2021-12-09 DIAGNOSIS — R10.13 EPIGASTRIC PAIN: Primary | ICD-10-CM

## 2021-12-09 DIAGNOSIS — I10 ESSENTIAL HYPERTENSION: ICD-10-CM

## 2021-12-09 DIAGNOSIS — R10.11 RUQ PAIN: ICD-10-CM

## 2021-12-09 DIAGNOSIS — L91.0 KELOID OF SKIN: ICD-10-CM

## 2021-12-09 DIAGNOSIS — G89.29 CHRONIC MIDLINE LOW BACK PAIN WITHOUT SCIATICA: ICD-10-CM

## 2021-12-09 DIAGNOSIS — I25.10 CORONARY ARTERY DISEASE INVOLVING NATIVE CORONARY ARTERY OF NATIVE HEART WITHOUT ANGINA PECTORIS: ICD-10-CM

## 2021-12-09 DIAGNOSIS — K21.9 GASTROESOPHAGEAL REFLUX DISEASE WITHOUT ESOPHAGITIS: ICD-10-CM

## 2021-12-09 DIAGNOSIS — E78.1 HIGH TRIGLYCERIDES: ICD-10-CM

## 2021-12-09 PROBLEM — Z86.010 HISTORY OF COLON POLYPS: Status: ACTIVE | Noted: 2021-12-09

## 2021-12-09 PROBLEM — Z86.0100 HISTORY OF COLON POLYPS: Status: ACTIVE | Noted: 2021-12-09

## 2021-12-09 PROCEDURE — 99214 OFFICE O/P EST MOD 30 MIN: CPT | Performed by: FAMILY MEDICINE

## 2021-12-09 RX ORDER — OMEPRAZOLE 20 MG/1
20 CAPSULE, DELAYED RELEASE ORAL
Qty: 180 CAPSULE | Refills: 1 | Status: SHIPPED | OUTPATIENT
Start: 2021-12-09 | End: 2022-06-07 | Stop reason: ALTCHOICE

## 2021-12-09 SDOH — ECONOMIC STABILITY: TRANSPORTATION INSECURITY
IN THE PAST 12 MONTHS, HAS THE LACK OF TRANSPORTATION KEPT YOU FROM MEDICAL APPOINTMENTS OR FROM GETTING MEDICATIONS?: NO

## 2021-12-09 SDOH — ECONOMIC STABILITY: TRANSPORTATION INSECURITY
IN THE PAST 12 MONTHS, HAS LACK OF TRANSPORTATION KEPT YOU FROM MEETINGS, WORK, OR FROM GETTING THINGS NEEDED FOR DAILY LIVING?: NO

## 2021-12-09 SDOH — ECONOMIC STABILITY: FOOD INSECURITY: WITHIN THE PAST 12 MONTHS, THE FOOD YOU BOUGHT JUST DIDN'T LAST AND YOU DIDN'T HAVE MONEY TO GET MORE.: NEVER TRUE

## 2021-12-09 SDOH — ECONOMIC STABILITY: FOOD INSECURITY: WITHIN THE PAST 12 MONTHS, YOU WORRIED THAT YOUR FOOD WOULD RUN OUT BEFORE YOU GOT MONEY TO BUY MORE.: NEVER TRUE

## 2021-12-09 ASSESSMENT — ENCOUNTER SYMPTOMS
COUGH: 0
SORE THROAT: 0
TROUBLE SWALLOWING: 0
CONSTIPATION: 0
NAUSEA: 0
BACK PAIN: 1
DIARRHEA: 0
VOMITING: 0
WHEEZING: 0
ABDOMINAL PAIN: 1
SHORTNESS OF BREATH: 0

## 2021-12-09 ASSESSMENT — SOCIAL DETERMINANTS OF HEALTH (SDOH): HOW HARD IS IT FOR YOU TO PAY FOR THE VERY BASICS LIKE FOOD, HOUSING, MEDICAL CARE, AND HEATING?: SOMEWHAT HARD

## 2021-12-09 ASSESSMENT — PATIENT HEALTH QUESTIONNAIRE - PHQ9
1. LITTLE INTEREST OR PLEASURE IN DOING THINGS: 0
2. FEELING DOWN, DEPRESSED OR HOPELESS: 0
SUM OF ALL RESPONSES TO PHQ QUESTIONS 1-9: 0
SUM OF ALL RESPONSES TO PHQ QUESTIONS 1-9: 0
SUM OF ALL RESPONSES TO PHQ9 QUESTIONS 1 & 2: 0
SUM OF ALL RESPONSES TO PHQ QUESTIONS 1-9: 0

## 2021-12-09 NOTE — PROGRESS NOTES
[facility]  [facility]  [address]  [place] [address]  Dept: [phone]    12/9/2021    CHIEF COMPLAINT    Chief Complaint   Patient presents with    Hypertension    Abdominal Pain     burning in his stomach    Lower Back Pain       HPI    Conor Locke is a 62 y.o. male who presents   Chief Complaint   Patient presents with    Hypertension    Abdominal Pain     burning in his stomach    Lower Back Pain   . Patient presents today for abdominal pain. This pain began over thanksgiving when he was with family. He says the pain is in his upper abdomen and off to his right side. The pain was sudden on onset and has been slowly improving until he was tailgating for a recent football game when his same symptoms returned. He rates his pain as 5/10 and describes the pain as a dull discomfort feeling. With the pain he has noticed occasional burning in his throat that happens shortly after eating. He has tried OTC norman-seltzer and pepto-bismol which he says has not helped a whole lot. In addition to his abdominal pain he complains of lower back pain that has been gradual on onset and an itchy scar on his right chest. He had a previous skin excision to his right chest and after it healed he says it left an enlarged scar. Patient admits he was drinking more alcohol than usual and fatty or fried foods on the occasions mentioned above. Vitals:    12/09/21 0944   BP: 132/66   Site: Left Upper Arm   Position: Sitting   Cuff Size: Large Adult   Pulse: 68   Resp: 16   Temp: 98.2 °F (36.8 °C)   TempSrc: Temporal   SpO2: 96%   Weight: 178 lb (80.7 kg)   Height: 5' 10\" (1.778 m)       REVIEW OF SYSTEMS    Review of Systems   Constitutional: Negative for appetite change, fatigue and fever. HENT: Negative for sore throat and trouble swallowing. Respiratory: Negative for cough, shortness of breath and wheezing.     Cardiovascular: Negative for chest pain and leg swelling. Gastrointestinal: Positive for abdominal pain. Negative for constipation, diarrhea, nausea and vomiting. Genitourinary: Negative for dysuria, frequency and urgency. Musculoskeletal: Positive for back pain (Lower back). Skin:        Keloid-right chest   Neurological: Negative for dizziness, weakness and numbness. Psychiatric/Behavioral: Negative. PAST MEDICAL HISTORY    Past Medical History:   Diagnosis Date    Alcohol abuse     Allergic rhinitis     CAD (coronary artery disease) 2018    Chronic back pain     GERD (gastroesophageal reflux disease)     History of colon polyps 2021    Hypertension     Osteoarthritis     [facility]    Dr. Reginaldo Argueta, Dr. Delores Todd. radiation and seed implants       FAMILY HISTORY    Family History   Problem Relation Age of Onset    Heart Disease Father     Heart Disease Mother     Heart Disease Brother     Heart Disease Sister     Other Sister 47        cabg       SOCIAL HISTORY    Social History     Socioeconomic History    Marital status:      Spouse name: None    Number of children: None    Years of education: None    Highest education level: None   Occupational History    Occupation: floors at [employer]     Comment: plowing, cutting grass for [employer]   Tobacco Use    Smoking status: Former Smoker     Packs/day: 0.00     Years: 15.00     Pack years: 0.00     Quit date: 1/1/2006     Years since quitting: 15.9    Smokeless tobacco: Never Used   Vaping Use    Vaping Use: Never used   Substance and Sexual Activity    Alcohol use:  Yes     Alcohol/week: 21.0 standard drinks     Types: 21 Cans of beer per week     Comment: 4-5 beers daily    Drug use: No    Sexual activity: Yes     Partners: Female   Other Topics Concern    None   Social History Narrative    None     Social Determinants of Health     Financial Resource Strain: Medium Risk    Difficulty of Paying Living Expenses: Somewhat hard   Food Insecurity: No Food Insecurity    Worried About Running Out of Food in the Last Year: Never true    Yoana of Food in the Last Year: Never true   Transportation Needs: No Transportation Needs    Lack of Transportation (Medical): No    Lack of Transportation (Non-Medical):  No   Physical Activity:     Days of Exercise per Week: Not on file    Minutes of Exercise per Session: Not on file   Stress:     Feeling of Stress : Not on file   Social Connections:     Frequency of Communication with Friends and Family: Not on file    Frequency of Social Gatherings with Friends and Family: Not on file    Attends Restoration Services: Not on file    Active Member of [address] [place] [address] or Organizations: Not on file    Attends Club or Organization Meetings: Not on file    Marital Status: Not on file   Intimate Partner Violence:     Fear of Current or Ex-Partner: Not on file    Emotionally Abused: Not on file    Physically Abused: Not on file    Sexually Abused: Not on file   Housing Stability:     Unable to Pay for Housing in the Last Year: Not on file    Number of [place] in the Last Year: Not on file    Unstable Housing in the Last Year: Not on file       SURGICAL HISTORY    Past Surgical History:   Procedure Laterality Date    ANKLE SURGERY Left     APPENDECTOMY      COLONOSCOPY      CORONARY ANGIOPLASTY WITH STENT PLACEMENT  04/2018    Dr[facility]    KNEE SURGERY Left     patella    PROSTATE SURGERY  2019    seed implants       CURRENT MEDICATIONS    Current Outpatient Medications   Medication Sig Dispense Refill    omeprazole (PRILOSEC) 20 MG delayed release capsule Take 1 capsule by mouth 2 times daily (before meals) 180 capsule 1    ibuprofen (ADVIL;MOTRIN) 800 MG tablet Take 1 tablet by mouth every 8 hours as needed for Pain 90 tablet 0    losartan (COZAAR) 25 MG tablet TAKE 1 TABLET BY MOUTH EVERY DAY 90 tablet 3    atorvastatin (LIPITOR) 40 MG tablet Take 40 mg by mouth daily      aspirin 81 MG chewable tablet Take 81 mg by mouth       No current facility-administered medications for this visit. ALLERGIES    No Known Allergies    PHYSICAL EXAM   Physical Exam  Vitals reviewed. Constitutional:       Appearance: Normal appearance. He is normal weight. HENT:      Head: Normocephalic. Eyes:      Pupils: Pupils are equal, round, and reactive to light. Cardiovascular:      Rate and Rhythm: Normal rate and regular rhythm. Pulses: Normal pulses. Heart sounds: Normal heart sounds. Pulmonary:      Effort: Pulmonary effort is normal.      Breath sounds: Normal breath sounds. Abdominal:      General: Abdomen is flat. Palpations: Abdomen is soft. Tenderness: There is abdominal tenderness in the right upper quadrant and epigastric area. Musculoskeletal:      Lumbar back: Tenderness present. Skin:     General: Skin is warm and dry. Findings: Lesion (Keloid scar to right chest) present. Neurological:      General: No focal deficit present. Mental Status: He is alert and oriented to person, place, and time. Psychiatric:         Mood and Affect: Mood normal.         Behavior: Behavior normal.         Thought Content: Thought content normal.         Judgment: Judgment normal.         ASSESSMENT/PLAN  1. Epigastric pain  - Start Prilosec. - Encouraged diet modification towards a healthy diet and away from fried fatty foods. Limit alcohol intake as much as possible. - CBC Auto Differential; Future  - Lipase; Future  - US PANCREAS; Future  - omeprazole (PRILOSEC) 20 MG delayed release capsule; Take 1 capsule by mouth 2 times daily (before meals)  Dispense: 180 capsule; Refill: 1    2. RUQ pain  - See above. - CBC Auto Differential; Future  - Lipase; Future  - US GALLBLADDER RUQ; Future  - US LIVER; Future    3. Gastroesophageal reflux disease without esophagitis  - See above. 4. Keloid of skin  - External Referral To Plastic Surgery- dr. Valarie Lombard    5.  Essential hypertension  - Chronic and stable. Continue medication.  - Diet modification as above. - Comprehensive Metabolic Panel; Future    6. High triglycerides  - Encouraged diet modification towards a healthy diet. - Avoid fried fatty foods.  - Lipid Panel; Future    7. Coronary artery disease involving native coronary artery of native heart without angina pectoris  - Call cardiology office to schedule an appointment with a new provider.  - Lipid Panel; Future    8. Prostate cancer ([facility] [address]  - Continue to follow up with urology  - CBC Auto Differential; Future    9. Chronic midline low back pain without sciatica  - Ibuprofen PRN.  - Encouraged heat and ice therapy to help relieve pain. Tushar Nice received counseling on the following healthy behaviors: nutrition, exercise and medication adherence  Reviewed prior labs and health maintenance. Continue current medications, diet and exercise. Discussed use, benefit, and side effects of prescribed medications. Barriers to medication compliance addressed. Patient given educational materials - see patient instructions. All patient questions answered. Patient voiced understanding. Return in about 4 months (around 4/9/2022) for back pain, abd pain. I have discussed the care of Gunjan Thomas, including pertinent history and exam findings with the FNP student. I have reviewed the key elements of all parts of the encounter. I have seen and examined the patient with the student and the key elements of all parts of the encounter have been performed by me. I agree with the assessment, and status of the problem list as documented. The plan and orders should include   Orders Placed This Encounter   Procedures    US GALLBLADDER RUQ    US LIVER    US PANCREAS    CBC Auto Differential    Comprehensive Metabolic Panel    Lipase    Lipid Panel    External Referral To Plastic Surgery    and this was also documented. The medication list was reviewed and is up to date. Marina Smith MD      Electronically signed by Nohemy Lopez on 12/9/21 at 11:23 AM EST

## 2021-12-11 LAB
ALBUMIN SERPL-MCNC: NORMAL G/DL
ALP BLD-CCNC: NORMAL U/L
ALT SERPL-CCNC: NORMAL U/L
ANION GAP SERPL CALCULATED.3IONS-SCNC: NORMAL MMOL/L
AST SERPL-CCNC: NORMAL U/L
BASOPHILS ABSOLUTE: NORMAL
BASOPHILS RELATIVE PERCENT: NORMAL
BILIRUB SERPL-MCNC: NORMAL MG/DL
BUN BLDV-MCNC: NORMAL MG/DL
CALCIUM SERPL-MCNC: NORMAL MG/DL
CHLORIDE BLD-SCNC: NORMAL MMOL/L
CHOLESTEROL, TOTAL: NORMAL
CHOLESTEROL/HDL RATIO: NORMAL
CO2: NORMAL
CREAT SERPL-MCNC: NORMAL MG/DL
EOSINOPHILS ABSOLUTE: NORMAL
EOSINOPHILS RELATIVE PERCENT: NORMAL
GFR CALCULATED: NORMAL
GLUCOSE BLD-MCNC: NORMAL MG/DL
HCT VFR BLD CALC: NORMAL %
HDLC SERPL-MCNC: NORMAL MG/DL
HEMOGLOBIN: NORMAL
LDL CHOLESTEROL CALCULATED: NORMAL
LIPASE: NORMAL
LYMPHOCYTES ABSOLUTE: NORMAL
LYMPHOCYTES RELATIVE PERCENT: NORMAL
MCH RBC QN AUTO: NORMAL PG
MCHC RBC AUTO-ENTMCNC: NORMAL G/DL
MCV RBC AUTO: NORMAL FL
MONOCYTES ABSOLUTE: NORMAL
MONOCYTES RELATIVE PERCENT: NORMAL
NEUTROPHILS ABSOLUTE: NORMAL
NEUTROPHILS RELATIVE PERCENT: NORMAL
NONHDLC SERPL-MCNC: NORMAL MG/DL
PDW BLD-RTO: NORMAL %
PLATELET # BLD: NORMAL 10*3/UL
PMV BLD AUTO: NORMAL FL
POTASSIUM SERPL-SCNC: NORMAL MMOL/L
RBC # BLD: NORMAL 10*6/UL
SODIUM BLD-SCNC: NORMAL MMOL/L
TOTAL PROTEIN: NORMAL
TRIGL SERPL-MCNC: NORMAL MG/DL
VLDLC SERPL CALC-MCNC: NORMAL MG/DL
WBC # BLD: NORMAL 10*3/UL

## 2021-12-14 DIAGNOSIS — I25.10 CORONARY ARTERY DISEASE INVOLVING NATIVE CORONARY ARTERY OF NATIVE HEART WITHOUT ANGINA PECTORIS: ICD-10-CM

## 2021-12-14 DIAGNOSIS — R10.13 EPIGASTRIC PAIN: ICD-10-CM

## 2021-12-14 DIAGNOSIS — C61 PROSTATE CANCER (HCC): ICD-10-CM

## 2021-12-14 DIAGNOSIS — E78.1 HIGH TRIGLYCERIDES: ICD-10-CM

## 2021-12-14 DIAGNOSIS — R10.11 RUQ PAIN: ICD-10-CM

## 2021-12-14 DIAGNOSIS — I10 ESSENTIAL HYPERTENSION: ICD-10-CM

## 2022-01-05 DIAGNOSIS — S76.112A QUADRICEPS STRAIN, LEFT, INITIAL ENCOUNTER: ICD-10-CM

## 2022-01-05 RX ORDER — IBUPROFEN 800 MG/1
800 TABLET ORAL EVERY 8 HOURS PRN
Qty: 90 TABLET | Refills: 0 | Status: SHIPPED | OUTPATIENT
Start: 2022-01-05

## 2022-04-11 ENCOUNTER — OFFICE VISIT (OUTPATIENT)
Dept: FAMILY MEDICINE CLINIC | Age: 62
End: 2022-04-11
Payer: COMMERCIAL

## 2022-04-11 VITALS
SYSTOLIC BLOOD PRESSURE: 130 MMHG | OXYGEN SATURATION: 97 % | DIASTOLIC BLOOD PRESSURE: 60 MMHG | WEIGHT: 177.8 LBS | HEART RATE: 61 BPM | BODY MASS INDEX: 24.89 KG/M2 | HEIGHT: 71 IN

## 2022-04-11 DIAGNOSIS — M54.50 CHRONIC MIDLINE LOW BACK PAIN WITHOUT SCIATICA: Primary | ICD-10-CM

## 2022-04-11 DIAGNOSIS — I10 ESSENTIAL HYPERTENSION: ICD-10-CM

## 2022-04-11 DIAGNOSIS — B35.3 TINEA PEDIS OF BOTH FEET: ICD-10-CM

## 2022-04-11 DIAGNOSIS — K21.9 GASTROESOPHAGEAL REFLUX DISEASE WITHOUT ESOPHAGITIS: ICD-10-CM

## 2022-04-11 DIAGNOSIS — G89.29 CHRONIC MIDLINE LOW BACK PAIN WITHOUT SCIATICA: Primary | ICD-10-CM

## 2022-04-11 DIAGNOSIS — E78.2 MIXED HYPERLIPIDEMIA: ICD-10-CM

## 2022-04-11 PROCEDURE — 99214 OFFICE O/P EST MOD 30 MIN: CPT | Performed by: FAMILY MEDICINE

## 2022-04-11 RX ORDER — TERBINAFINE HYDROCHLORIDE 250 MG/1
250 TABLET ORAL DAILY
Qty: 84 TABLET | Refills: 0 | Status: SHIPPED | OUTPATIENT
Start: 2022-04-11 | End: 2022-07-11

## 2022-04-11 ASSESSMENT — ENCOUNTER SYMPTOMS
VOMITING: 0
CHEST TIGHTNESS: 0
SHORTNESS OF BREATH: 0
ABDOMINAL PAIN: 0
WHEEZING: 0
CONSTIPATION: 0
NAUSEA: 0
DIARRHEA: 0
BACK PAIN: 0

## 2022-04-11 NOTE — PROGRESS NOTES
[facility]  [address]  [address] [address] [facility] [address]  Dept: [phone]    4/11/2022    CHIEF COMPLAINT    Chief Complaint   Patient presents with    Back Pain    Abdominal Pain    Hypertension       HPI    James Isaac is a 62 y.o. male who presents   Chief Complaint   Patient presents with    Back Pain    Abdominal Pain    Hypertension   . Patient presents for follow up on chronic back pain and abdominal pain. Takes medications as prescribed with no side effects. Currently does not have any lower back pain but when he does he states he uses ibuprofen. Previous complaints of abdominal pain have been relieved with diet modifications and reduction in alcohol ingestion. Currently complaining of toe nail fungus. He states he had an episode before and took an oral medication to help relieve the fungus. Taking medication as prescribed for hypertension, hyperlipidemia and GERD. Vitals:    04/11/22 0732   BP: 130/60   Pulse: 61   SpO2: 97%   Weight: 177 lb 12.8 oz (80.6 kg)   Height: 5' 11\" (1.803 m)       REVIEW OF SYSTEMS    Review of Systems   Constitutional: Negative for fatigue and fever. Respiratory: Negative for chest tightness, shortness of breath and wheezing. Cardiovascular: Negative for chest pain, palpitations and leg swelling. Gastrointestinal: Negative for abdominal pain, constipation, diarrhea, nausea and vomiting. Musculoskeletal: Negative for arthralgias and back pain. Skin: Negative for rash and wound. Change in toe nail color   Neurological: Negative. Psychiatric/Behavioral: Negative for dysphoric mood. The patient is not nervous/anxious.         PAST MEDICAL HISTORY    Past Medical History:   Diagnosis Date    Alcohol abuse     Allergic rhinitis     CAD (coronary artery disease) 2018    Chronic back pain     GERD (gastroesophageal reflux disease)     History of colon polyps 2021    Hypertension     Osteoarthritis     Prostate cancer [facility]) 2019    Dr. Richard Hair, Dr. Ko Ramirez. 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[employer]   Tobacco Use    Smoking status: Former Smoker     Packs/day: 0.00     Years: 15.00     Pack years: 0.00     Quit date: 2006     Years since quittin.2    Smokeless tobacco: Never Used   Vaping Use    Vaping Use: Never used   Substance and Sexual Activity    Alcohol use: Yes     Alcohol/week: 21.0 standard drinks     Types: 21 Cans of beer per week     Comment: 4-5 beers daily    Drug use: No    Sexual activity: Yes     Partners: Female   Other Topics Concern    None   Social History Narrative    None     Social Determinants of Health     Financial Resource Strain: Medium Risk    Difficulty of Paying Living Expenses: Somewhat hard   Food Insecurity: No Food Insecurity    Worried About Running Out of Food in the Last Year: Never true    Yoana of Food in the Last Year: Never true   Transportation Needs: No Transportation Needs    Lack of Transportation (Medical): No    Lack of Transportation (Non-Medical):  No   Physical Activity:     Days of Exercise per Week: Not on file    Minutes of Exercise per Session: Not on file   Stress:     Feeling of Stress : Not on file   Social Connections:     Frequency of Communication with Friends and Family: Not on file    Frequency of Social Gatherings with Friends and Family: Not on file    Attends Latter-day Services: Not on file   [address] [facility] [employer] or Organizations: Not on file    Attends Club or Organization Meetings: Not on file    Marital Status: Not on file   Intimate Partner Violence:     Fear of Current or Ex-Partner: Not on file    Emotionally Abused: Not on file    Physically Abused: Not on file    Sexually Abused: Not on file   Housing Stability:     Unable to Pay for Housing in the Last Year: Not on file    Number of [place] in the Last Year: Not on file    Unstable Housing in the Last Year: Not on file       SURGICAL HISTORY    Past Surgical History:   Procedure Laterality Date    ANKLE SURGERY Left     APPENDECTOMY      [address]ONOSCOPY     [address] 04[address]2018    [facility]    KNEE SURGERY Left     patella   [address]  2019    seed implants       CURRENT MEDICATIONS    Current Outpatient Medications   Medication Sig Dispense Refill    terbinafine (LAMISIL) 250 MG tablet Take 1 tablet by mouth daily 84 tablet 0    ibuprofen (ADVIL;MOTRIN) 800 MG tablet TAKE 1 TABLET BY MOUTH EVERY 8 HOURS AS NEEDED FOR PAIN 90 tablet 0    omeprazole (PRILOSEC) 20 MG delayed release capsule Take 1 capsule by mouth 2 times daily (before meals) 180 capsule 1    losartan (COZAAR) 25 MG tablet TAKE 1 TABLET BY MOUTH EVERY DAY 90 tablet 3    aspirin 81 MG chewable tablet Take 81 mg by mouth      atorvastatin (LIPITOR) 40 MG tablet Take 40 mg by mouth daily       No current facility-administered medications for this visit. ALLERGIES    No Known Allergies    PHYSICAL EXAM   Physical Exam  Vitals reviewed. Constitutional:       Appearance: Normal appearance. He is normal weight. HENT:      Head: Normocephalic. Eyes:      Conjunctiva/sclera: Conjunctivae normal.      Pupils: Pupils are equal, round, and reactive to light. Cardiovascular:      Rate and Rhythm: Normal rate and regular rhythm. Pulses: Normal pulses. Heart sounds: Normal heart sounds.  No murmur heard. Pulmonary:      Effort: Pulmonary effort is normal. No respiratory distress. Breath sounds: Normal breath sounds. No wheezing. Abdominal:      General: Abdomen is flat. There is no distension. Palpations: Abdomen is soft. Tenderness: There is no abdominal tenderness. Feet:      Right foot:      Toenail Condition: Right toenails are abnormally thick. Fungal disease present. Left foot:      Toenail Condition: Left toenails are abnormally thick. Fungal disease present. Skin:     General: Skin is warm. Neurological:      General: No focal deficit present. Mental Status: He is alert and oriented to person, place, and time. Psychiatric:         Mood and Affect: Mood normal.         Behavior: Behavior normal.         Thought Content: Thought content normal.         Judgment: Judgment normal.         ASSESSMENT/PLAN  1. Chronic midline low back pain without sciatica  - Chronic. Continue medication as needed. Continue activity as tolerated. 2. Essential hypertension  - Chronic and stable. Continue current medication. 3. Mixed hyperlipidemia  - Chronic and stable. Continue current medication. 4. Gastroesophageal reflux disease without esophagitis  - Chronic and stable, no acute complaints. Continue current medication. Continue diet modification and reduction in alcohol ingestion    5. Tinea pedis of both feet  - Discussed keeping feet clean/dry, wearing cotton socks, and switching boots every other day to decrease the chance of fungal growth. Recommend seeing a podiatrist due to difficulty in clipping toenails. - terbinafine (LAMISIL) 250 MG tablet; Take 1 tablet by mouth daily  Dispense: 84 tablet; Refill: 0  - Ceci Yang DPM, Podiatry, [facility] received counseling on the following healthy behaviors: nutrition, exercise and medication adherence  Reviewed prior labs and health maintenance.   Continue current medications, diet and exercise. Discussed use, benefit, and side effects of prescribed medications. Barriers to medication compliance addressed. Patient given educational materials - see patient instructions. All patient questions answered. Patient voiced understanding. Return in about 6 months (around 10/11/2022) for htn, cholesterol. I have discussed the care of Manish Choudhary, including pertinent history and exam findings with the FNP student. I have reviewed the key elements of all parts of the encounter. I have seen and examined the patient with the student and the key elements of all parts of the encounter have been performed by me. I agree with the assessment, and status of the problem list as documented. The plan and orders should include   Orders Placed This Encounter   Procedures   Ar Coronado DPM, Podiatry, [facility]    and this was also documented. The medication list was reviewed and is up to date.      Ning Melo MD    Electronically signed by Davian Torres on 4/11/22 at 7:43 AM EDT

## 2022-05-16 ENCOUNTER — TELEPHONE (OUTPATIENT)
Dept: FAMILY MEDICINE CLINIC | Age: 62
End: 2022-05-16

## 2022-05-16 NOTE — TELEPHONE ENCOUNTER
Patient called with concerns his eye twitches and then his mouth rosi droops down he is wondering are these signs of a stroke or what can cause this to keep happening.

## 2022-05-16 NOTE — TELEPHONE ENCOUNTER
Pt stated has been experiencing this for some time and stated his son brought it to his attention over the weekend

## 2022-06-07 ENCOUNTER — OFFICE VISIT (OUTPATIENT)
Dept: FAMILY MEDICINE CLINIC | Age: 62
End: 2022-06-07
Payer: COMMERCIAL

## 2022-06-07 VITALS
WEIGHT: 167.2 LBS | BODY MASS INDEX: 23.32 KG/M2 | OXYGEN SATURATION: 97 % | SYSTOLIC BLOOD PRESSURE: 120 MMHG | HEART RATE: 65 BPM | DIASTOLIC BLOOD PRESSURE: 60 MMHG

## 2022-06-07 DIAGNOSIS — R51.9 NEW ONSET HEADACHE: ICD-10-CM

## 2022-06-07 DIAGNOSIS — I10 ESSENTIAL HYPERTENSION: ICD-10-CM

## 2022-06-07 DIAGNOSIS — R25.3 MUSCLE TWITCHING: Primary | ICD-10-CM

## 2022-06-07 PROCEDURE — 99213 OFFICE O/P EST LOW 20 MIN: CPT | Performed by: FAMILY MEDICINE

## 2022-06-07 ASSESSMENT — ENCOUNTER SYMPTOMS
BLOOD IN STOOL: 0
SHORTNESS OF BREATH: 0
EYES NEGATIVE: 1
ALLERGIC/IMMUNOLOGIC NEGATIVE: 1
COUGH: 0
DIARRHEA: 0
CONSTIPATION: 0
ABDOMINAL PAIN: 0

## 2022-06-07 ASSESSMENT — PATIENT HEALTH QUESTIONNAIRE - PHQ9
1. LITTLE INTEREST OR PLEASURE IN DOING THINGS: 0
2. FEELING DOWN, DEPRESSED OR HOPELESS: 0
SUM OF ALL RESPONSES TO PHQ QUESTIONS 1-9: 0
SUM OF ALL RESPONSES TO PHQ9 QUESTIONS 1 & 2: 0
SUM OF ALL RESPONSES TO PHQ QUESTIONS 1-9: 0

## 2022-06-07 NOTE — LETTER
[facility]  [address] [place] [address] [address] [facility] [address]  Phone: [phone]  Fax: [phone]    Vika Caballero MD        June 7, 2022     Patient: Radhames Godinez   YOB: 1960   Date of Visit: 6/7/2022       To Whom It May Concern: Radhames Godinez was seen in office on 06/07/2022. If you have any questions or concerns, please don't hesitate to call.     Sincerely,        Vika Caballero MD

## 2022-06-07 NOTE — PROGRESS NOTES
[address] [place] [address]  Dept: [phone]    6/7/2022    CHIEF COMPLAINT    Chief Complaint   Patient presents with    Eye Problem     face twitching and eye       HPI    Keaton Dash is a 62 y.o. male who presents   Chief Complaint   Patient presents with    Eye Problem     face twitching and eye   . Twitching of left side of face and eye. Intermittent, currently daily. Sx started several months ago. Has been getting left sided headache mostly at night. No visual disturbance. Has been vaccinated for covid. Vitals:    06/07/22 1434   BP: 120/60   Pulse: 65   SpO2: 97%   Weight: 167 lb 3.2 oz (75.8 kg)       REVIEW OF SYSTEMS    Review of Systems   Constitutional: Negative for fatigue, fever and unexpected weight change. HENT: Negative. Eyes: Negative. Respiratory: Negative for cough and shortness of breath. Cardiovascular: Negative for chest pain and leg swelling. Gastrointestinal: Negative for abdominal pain, blood in stool, constipation and diarrhea. Endocrine: Negative. Genitourinary: Negative for frequency and urgency. Musculoskeletal: Negative. Skin: Negative. Allergic/Immunologic: Negative. Neurological: Negative for dizziness and headaches. See HPI   Hematological: Negative. Psychiatric/Behavioral: Negative for sleep disturbance. The patient is not nervous/anxious. PAST MEDICAL HISTORY    Past Medical History:   Diagnosis Date    Alcohol abuse     Allergic rhinitis     CAD (coronary artery disease) 2018    Chronic back pain     GERD (gastroesophageal reflux disease)     History of colon polyps 2021    Hypertension     Osteoarthritis     Prostate cancer ([facility] [address] 2019    Dr. Janice Hamman, Dr. Mio Rodriguez.  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[employer]   Tobacco Use    Smoking status: Former Smoker     Packs/day: 0.00     Years: 15.00     Pack years: 0.00     Quit date: 2006     Years since quittin.4    Smokeless tobacco: Never Used   Vaping Use    Vaping Use: Never used   Substance and Sexual Activity    Alcohol use: Yes     Alcohol/week: 21.0 standard drinks     Types: 21 Cans of beer per week     Comment: 4-5 beers daily    Drug use: No    Sexual activity: Yes     Partners: Female   Other Topics Concern    None   Social History Narrative    None     Social Determinants of Health     Financial Resource Strain: Medium Risk    Difficulty of Paying Living Expenses: Somewhat hard   Food Insecurity: No Food Insecurity    Worried About Running Out of Food in the Last Year: Never true    Yoana of Food in the Last Year: Never true   Transportation Needs: No Transportation Needs    Lack of Transportation (Medical): No    Lack of Transportation (Non-Medical):  No   Physical Activity:     Days of Exercise per Week: Not on file    Minutes of Exercise per Session: Not on file   Stress:     Feeling of Stress : Not on file   Social Connections:     Frequency of Communication with Friends and Family: Not on file    Frequency of Social Gatherings with Friends and Family: Not on file    Attends Shinto Services: Not on file    Active Member of Clubs or Organizations: Not on file    Attends Club or Organization Meetings: Not on file    Marital Status: Not on file   Intimate Partner Violence:     Fear of Current or Ex-Partner: Not on file   [employer] Abused: Not on file    Physically Abused: Not on file    Sexually Abused: Not on file   Housing Stability:     Unable to Pay for Housing in the Last Year: Not on file    Number of [place] in the Last Year: Not on file    Unstable Housing in the Last Year: Not on file       SURGICAL HISTORY    Past Surgical History:   Procedure Laterality Date    ANKLE SURGERY Left     APPENDECTOMY      COLONOSCOPY     [address] 04/2018    [facility]    KNEE SURGERY Left     patella    PROSTATE SURGERY  2019    seed implants       CURRENT MEDICATIONS    Current Outpatient Medications   Medication Sig Dispense Refill    Magnesium 400 MG CAPS Take 400 mg by mouth daily 30 capsule 5    terbinafine (LAMISIL) 250 MG tablet Take 1 tablet by mouth daily 84 tablet 0    ibuprofen (ADVIL;MOTRIN) 800 MG tablet TAKE 1 TABLET BY MOUTH EVERY 8 HOURS AS NEEDED FOR PAIN 90 tablet 0    losartan (COZAAR) 25 MG tablet TAKE 1 TABLET BY MOUTH EVERY DAY 90 tablet 3    aspirin 81 MG chewable tablet Take 81 mg by mouth      atorvastatin (LIPITOR) 40 MG tablet Take 40 mg by mouth daily       No current facility-administered medications for this visit. ALLERGIES    No Known Allergies    PHYSICAL EXAM   Physical Exam  Vitals reviewed. Constitutional:       Appearance: He is well-developed. HENT:      Head: Normocephalic. Eyes:      Conjunctiva/sclera: Conjunctivae normal.      Pupils: Pupils are equal, round, and reactive to light. Neck:      Thyroid: No thyromegaly. Cardiovascular:      Rate and Rhythm: Normal rate and regular rhythm. Heart sounds: Normal heart sounds. No murmur heard. Pulmonary:      Effort: Pulmonary effort is normal.      Breath sounds: Normal breath sounds. No wheezing or rales. Abdominal:      Palpations: Abdomen is soft. Tenderness: There is no abdominal tenderness. There is no guarding or rebound.    Musculoskeletal:         General: No tenderness or deformity. Normal range of motion. Cervical back: Normal range of motion and neck supple. Lymphadenopathy:      Cervical: No cervical adenopathy. Skin:     General: Skin is warm and dry. Neurological:      Mental Status: He is alert and oriented to person, place, and time. Cranial Nerves: No cranial nerve deficit. Comments: Twitching of left thigh and left side of face down to and including his lips was observed   Psychiatric:         Mood and Affect: Mood normal.         Behavior: Behavior normal.         Thought Content: Thought content normal.         Judgment: Judgment normal.         ASSESSMENT/PLAN  1. Muscle twitching  Discussed possible connection with last COVID-vaccine. Discussed there is no way to verify that. Try a magnesium supplement. If symptoms worsen or persist would consider neuro referral or MRI imaging  - Magnesium 400 MG CAPS; Take 400 mg by mouth daily  Dispense: 30 capsule; Refill: 5    2. Essential hypertension  Chronic and stable. Continue current medication    3. New onset headache  Continue to monitor symptoms. If persistent or worsening refer to neuro or get MRI       Theran received counseling on the following healthy behaviors: nutrition, exercise and medication adherence  Reviewed prior labs and health maintenance. Continue current medications, diet and exercise. Discussed use, benefit, and side effects of prescribed medications. Barriers to medication compliance addressed. Patient given educational materials - see patient instructions. All patient questions answered. Patient voiced understanding. Return if symptoms worsen or fail to improve.         Electronically signed by Kassandra Burns MD on 6/7/22 at 2:41 PM EDT

## 2022-07-09 DIAGNOSIS — B35.3 TINEA PEDIS OF BOTH FEET: ICD-10-CM

## 2022-07-11 RX ORDER — TERBINAFINE HYDROCHLORIDE 250 MG/1
TABLET ORAL
Qty: 84 TABLET | Refills: 0 | Status: SHIPPED | OUTPATIENT
Start: 2022-07-11

## 2022-08-24 DIAGNOSIS — I10 ESSENTIAL HYPERTENSION: ICD-10-CM

## 2022-08-24 RX ORDER — LOSARTAN POTASSIUM 25 MG/1
TABLET ORAL
Qty: 90 TABLET | Refills: 3 | Status: SHIPPED | OUTPATIENT
Start: 2022-08-24

## 2022-09-06 ENCOUNTER — TELEPHONE (OUTPATIENT)
Dept: FAMILY MEDICINE CLINIC | Age: 62
End: 2022-09-06

## 2022-09-07 RX ORDER — AMOXICILLIN 875 MG/1
875 TABLET, COATED ORAL 2 TIMES DAILY
Qty: 20 TABLET | Refills: 0 | Status: SHIPPED | OUTPATIENT
Start: 2022-09-07 | End: 2022-09-17

## 2022-09-07 NOTE — TELEPHONE ENCOUNTER
No chest pain. Patient took covid test yesterday and it came back negative. He just needs antibiotics or something. Sx nasal drainage , headache , coughing , sinus pressure in his face , fatigue for about 5 days now.

## 2022-10-11 ENCOUNTER — OFFICE VISIT (OUTPATIENT)
Dept: FAMILY MEDICINE CLINIC | Age: 62
End: 2022-10-11
Payer: COMMERCIAL

## 2022-10-11 VITALS
TEMPERATURE: 96.6 F | HEART RATE: 71 BPM | BODY MASS INDEX: 24.3 KG/M2 | WEIGHT: 173.6 LBS | DIASTOLIC BLOOD PRESSURE: 86 MMHG | HEIGHT: 71 IN | SYSTOLIC BLOOD PRESSURE: 134 MMHG | OXYGEN SATURATION: 98 %

## 2022-10-11 DIAGNOSIS — I10 ESSENTIAL HYPERTENSION: Primary | ICD-10-CM

## 2022-10-11 DIAGNOSIS — Z23 NEED FOR INFLUENZA VACCINATION: ICD-10-CM

## 2022-10-11 DIAGNOSIS — E78.2 MIXED HYPERLIPIDEMIA: ICD-10-CM

## 2022-10-11 DIAGNOSIS — R05.3 CHRONIC COUGH: ICD-10-CM

## 2022-10-11 DIAGNOSIS — I25.10 CORONARY ARTERY DISEASE INVOLVING NATIVE CORONARY ARTERY OF NATIVE HEART WITHOUT ANGINA PECTORIS: ICD-10-CM

## 2022-10-11 DIAGNOSIS — C61 PROSTATE CANCER (HCC): ICD-10-CM

## 2022-10-11 DIAGNOSIS — K21.9 GASTROESOPHAGEAL REFLUX DISEASE WITHOUT ESOPHAGITIS: ICD-10-CM

## 2022-10-11 PROCEDURE — 90471 IMMUNIZATION ADMIN: CPT | Performed by: FAMILY MEDICINE

## 2022-10-11 PROCEDURE — 99214 OFFICE O/P EST MOD 30 MIN: CPT | Performed by: FAMILY MEDICINE

## 2022-10-11 PROCEDURE — 90674 CCIIV4 VAC NO PRSV 0.5 ML IM: CPT | Performed by: FAMILY MEDICINE

## 2022-10-11 RX ORDER — GUAIFENESIN AND CODEINE PHOSPHATE 100; 10 MG/5ML; MG/5ML
5 SOLUTION ORAL EVERY 4 HOURS PRN
Qty: 180 ML | Refills: 0 | Status: SHIPPED | OUTPATIENT
Start: 2022-10-11 | End: 2022-10-18

## 2022-10-11 ASSESSMENT — ENCOUNTER SYMPTOMS
BACK PAIN: 1
DIARRHEA: 0
ABDOMINAL PAIN: 0
CONSTIPATION: 0
ALLERGIC/IMMUNOLOGIC NEGATIVE: 1
EYES NEGATIVE: 1
BLOOD IN STOOL: 0
SHORTNESS OF BREATH: 0
COUGH: 0

## 2022-10-11 ASSESSMENT — PATIENT HEALTH QUESTIONNAIRE - PHQ9
2. FEELING DOWN, DEPRESSED OR HOPELESS: 0
SUM OF ALL RESPONSES TO PHQ QUESTIONS 1-9: 0
SUM OF ALL RESPONSES TO PHQ QUESTIONS 1-9: 0
1. LITTLE INTEREST OR PLEASURE IN DOING THINGS: 0
SUM OF ALL RESPONSES TO PHQ9 QUESTIONS 1 & 2: 0
SUM OF ALL RESPONSES TO PHQ QUESTIONS 1-9: 0
SUM OF ALL RESPONSES TO PHQ QUESTIONS 1-9: 0

## 2022-10-11 NOTE — PROGRESS NOTES
[facility]  [address]   [address] [facility] [address]  Dept: [phone]    10/11/2022    CHIEF COMPLAINT    Chief Complaint   Patient presents with    Hypertension    Back Pain       HPI    Fabio Segura is a 62 y.o. male who presents   Chief Complaint   Patient presents with    Hypertension    Back Pain   . Recheck chronic conditions including htn, high cholesterol, cad, prostate cancer, gerd. Currently has a cough when working outside. Vitals:    10/11/22 0808   BP: 134/86   Pulse: 71   Temp: (!) 96.6 °F (35.9 °C)   SpO2: 98%   Weight: 173 lb 9.6 oz (78.7 kg)   Height: 5' 11\" (1.803 m)       REVIEW OF SYSTEMS    Review of Systems   Constitutional:  Negative for fatigue, fever and unexpected weight change. HENT: Negative. Eyes: Negative. Respiratory:  Negative for cough and shortness of breath. Cardiovascular:  Negative for chest pain and leg swelling. Gastrointestinal:  Negative for abdominal pain, blood in stool, constipation and diarrhea. Endocrine: Negative. Genitourinary:  Negative for frequency and urgency. Musculoskeletal:  Positive for arthralgias and back pain. Skin: Negative. Allergic/Immunologic: Negative. Neurological:  Negative for dizziness and headaches. Hematological: Negative. Psychiatric/Behavioral:  Negative for sleep disturbance. The patient is not nervous/anxious. PAST MEDICAL HISTORY    Past Medical History:   Diagnosis Date    Alcohol abuse     Allergic rhinitis     CAD (coronary artery disease) 2018    Chronic back pain     GERD (gastroesophageal reflux disease)     History of colon polyps 2021    Hypertension     Osteoarthritis     [facility] 2019    Dr. Abdiel Ayon, Dr. Benito Mc.  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[employer]   Tobacco Use    Smoking status: Former     Packs/day: 0.00     Years: 15.00     Pack years: 0.00     Types: Cigarettes     Quit date: 2006     Years since quittin.7    Smokeless tobacco: Never   Vaping Use    Vaping Use: Never used   Substance and Sexual Activity    Alcohol use: Yes     Alcohol/week: 21.0 standard drinks     Types: 21 Cans of beer per week     Comment: 4-5 beers daily    Drug use: No    Sexual activity: Yes     Partners: Female     Social Determinants of Health     Financial Resource Strain: Medium Risk    Difficulty of Paying Living Expenses: Somewhat hard   Food Insecurity: No Food Insecurity    Worried About Running Out of Food in the Last Year: Never true    Ran Out of Food in the Last Year: Never true   Transportation Needs: No Transportation Needs    Lack of Transportation (Medical): No    Lack of Transportation (Non-Medical): No       SURGICAL HISTORY    Past Surgical History:   Procedure Laterality Date    ANKLE SURGERY Left     APPENDECTOMY      COLONOSCOPY      CORONARY ANGIOPLASTY WITH STENT PLACEMENT  2018    Dr. Maxine [facility], [facility]    KNEE SURGERY Left     patella    PROSTATE SURGERY  2019    seed implants       CURRENT MEDICATIONS    Current Outpatient Medications   Medication Sig Dispense Refill    guaiFENesin-codeine (CHERATUSSIN AC) 100-10 MG/5ML syrup Take 5 mLs by mouth every 4 hours as needed for Cough for up to 7 days.  180 mL 0    losartan (COZAAR) 25 MG tablet TAKE 1 TABLET BY MOUTH EVERY DAY 90 tablet 3    terbinafine (LAMISIL) 250 MG tablet TAKE 1 TABLET BY MOUTH EVERY DAY 84 tablet 0    Magnesium 400 MG CAPS Take 400 mg by mouth daily 30 capsule 5    ibuprofen (ADVIL;MOTRIN) 800 MG tablet TAKE 1 TABLET BY MOUTH EVERY 8 HOURS AS NEEDED FOR PAIN 90 tablet 0    aspirin 81 MG chewable tablet Take 81 mg by mouth      atorvastatin (LIPITOR) 40 MG tablet Take 40 mg by mouth daily       No current facility-administered medications for this visit. ALLERGIES    No Known Allergies    PHYSICAL EXAM   Physical Exam  Vitals reviewed. Constitutional:       Appearance: He is well-developed. HENT:      Head: Normocephalic. Eyes:      Pupils: Pupils are equal, round, and reactive to light. Neck:      Thyroid: No thyromegaly. Cardiovascular:      Rate and Rhythm: Normal rate and regular rhythm. Heart sounds: Normal heart sounds. No murmur heard. Pulmonary:      Effort: Pulmonary effort is normal.      Breath sounds: Normal breath sounds. No wheezing or rales. Abdominal:      Palpations: Abdomen is soft. Tenderness: There is no abdominal tenderness. There is no guarding or rebound. Musculoskeletal:         General: No tenderness or deformity. Normal range of motion. Cervical back: Normal range of motion and neck supple. Lymphadenopathy:      Cervical: No cervical adenopathy. Skin:     General: Skin is warm and dry. Neurological:      Mental Status: He is alert and oriented to person, place, and time. Psychiatric:         Mood and Affect: Mood normal.         Behavior: Behavior normal.         Thought Content: Thought content normal.         Judgment: Judgment normal.       ASSESSMENT/PLAN  1. Essential hypertension  Chronic, stable, continue current medication and/or plan    - Comprehensive Metabolic Panel; Future    2. Coronary artery disease involving native coronary artery of native heart without angina pectoris  Follow with cardiologist. Cont risk reduction. Make appt with cardiologist  - Vitamin D 25 Hydroxy; Future    3.  Prostate cancer [facility])  Following with urologist.   - CBC with Auto Differential; Future    4. Cough  Use as needed. Suggested antihistamine also  - guaiFENesin-codeine (CHERATUSSIN AC) 100-10 MG/5ML syrup; Take 5 mLs by mouth every 4 hours as needed for Cough for up to 7 days. Dispense: 180 mL; Refill: 0    5. Mixed hyperlipidemia  Recheck and cont statin  - Lipid Panel; Future    6. Need for influenza vaccination    - Influenza, FLUCELVAX, (age 10 mo+), IM, Preservative Free, 0.5 mL    7. Gastroesophageal reflux disease without esophagitis  Stable, modify diet as needed       Theran received counseling on the following healthy behaviors: nutrition, exercise, and medication adherence  Reviewed prior labs and health maintenance. Continue current medications, diet and exercise. Discussed use, benefit, and side effects of prescribed medications. Barriers to medication compliance addressed. Patient given educational materials - see patient instructions. All patient questions answered. Patient voiced understanding. Return in about 6 months (around 4/11/2023) for htn, cholesterol.         Electronically signed by George Reddy MD on 10/11/22 at 8:11 AM EDT

## 2022-10-14 LAB
ALBUMIN SERPL-MCNC: NORMAL G/DL
ALP BLD-CCNC: NORMAL U/L
ALT SERPL-CCNC: NORMAL U/L
ANION GAP SERPL CALCULATED.3IONS-SCNC: NORMAL MMOL/L
AST SERPL-CCNC: NORMAL U/L
BASOPHILS ABSOLUTE: NORMAL
BASOPHILS RELATIVE PERCENT: NORMAL
BILIRUB SERPL-MCNC: NORMAL MG/DL
BUN BLDV-MCNC: NORMAL MG/DL
CALCIUM SERPL-MCNC: NORMAL MG/DL
CHLORIDE BLD-SCNC: NORMAL MMOL/L
CHOLESTEROL, TOTAL: NORMAL
CHOLESTEROL/HDL RATIO: NORMAL
CO2: NORMAL
CREAT SERPL-MCNC: NORMAL MG/DL
EOSINOPHILS ABSOLUTE: NORMAL
EOSINOPHILS RELATIVE PERCENT: NORMAL
GFR CALCULATED: NORMAL
GLUCOSE BLD-MCNC: NORMAL MG/DL
HCT VFR BLD CALC: NORMAL %
HDLC SERPL-MCNC: NORMAL MG/DL
HEMOGLOBIN: NORMAL
LDL CHOLESTEROL CALCULATED: NORMAL
LYMPHOCYTES ABSOLUTE: NORMAL
LYMPHOCYTES RELATIVE PERCENT: NORMAL
MCH RBC QN AUTO: NORMAL PG
MCHC RBC AUTO-ENTMCNC: NORMAL G/DL
MCV RBC AUTO: NORMAL FL
MONOCYTES ABSOLUTE: NORMAL
MONOCYTES RELATIVE PERCENT: NORMAL
NEUTROPHILS ABSOLUTE: NORMAL
NEUTROPHILS RELATIVE PERCENT: NORMAL
NONHDLC SERPL-MCNC: NORMAL MG/DL
PDW BLD-RTO: NORMAL %
PLATELET # BLD: NORMAL 10*3/UL
PMV BLD AUTO: NORMAL FL
POTASSIUM SERPL-SCNC: NORMAL MMOL/L
RBC # BLD: NORMAL 10*6/UL
SODIUM BLD-SCNC: NORMAL MMOL/L
TOTAL PROTEIN: NORMAL
TRIGL SERPL-MCNC: NORMAL MG/DL
VITAMIN D 25-HYDROXY: NORMAL
VITAMIN D2, 25 HYDROXY: NORMAL
VITAMIN D3,25 HYDROXY: NORMAL
VLDLC SERPL CALC-MCNC: NORMAL MG/DL
WBC # BLD: NORMAL 10*3/UL

## 2022-10-17 DIAGNOSIS — C61 PROSTATE CANCER (HCC): ICD-10-CM

## 2022-10-17 DIAGNOSIS — I25.10 CORONARY ARTERY DISEASE INVOLVING NATIVE CORONARY ARTERY OF NATIVE HEART WITHOUT ANGINA PECTORIS: ICD-10-CM

## 2022-10-17 DIAGNOSIS — I10 ESSENTIAL HYPERTENSION: ICD-10-CM

## 2022-10-17 DIAGNOSIS — E78.2 MIXED HYPERLIPIDEMIA: ICD-10-CM

## 2022-12-14 ENCOUNTER — TELEPHONE (OUTPATIENT)
Dept: FAMILY MEDICINE CLINIC | Age: 62
End: 2022-12-14

## 2023-03-29 DIAGNOSIS — R05.3 CHRONIC COUGH: ICD-10-CM

## 2023-03-29 DIAGNOSIS — S76.112A QUADRICEPS STRAIN, LEFT, INITIAL ENCOUNTER: ICD-10-CM

## 2023-03-29 NOTE — TELEPHONE ENCOUNTER
Pt states he is currently working in damp weather and has been experiencing a cough and body aches for a week. He has tried nyquil which has not helped        Leslie Childs is calling to request a refill on the following medication(s):    Last Visit Date (If Applicable):  28/34/2112    Next Visit Date:    Visit date not found    Medication Request:  Requested Prescriptions     Pending Prescriptions Disp Refills    ibuprofen (ADVIL;MOTRIN) 800 MG tablet 90 tablet 0     Sig: Take 1 tablet by mouth every 8 hours as needed for Pain    guaiFENesin-codeine (CHERATUSSIN AC) 100-10 MG/5ML syrup 180 mL 0     Sig: Take 5 mLs by mouth every 4 hours as needed for Cough for up to 7 days.

## 2023-03-30 RX ORDER — GUAIFENESIN AND CODEINE PHOSPHATE 100; 10 MG/5ML; MG/5ML
5 SOLUTION ORAL EVERY 4 HOURS PRN
Qty: 180 ML | Refills: 0 | OUTPATIENT
Start: 2023-03-30 | End: 2023-04-06

## 2023-03-30 RX ORDER — IBUPROFEN 800 MG/1
800 TABLET ORAL EVERY 8 HOURS PRN
Qty: 90 TABLET | Refills: 0 | Status: SHIPPED | OUTPATIENT
Start: 2023-03-30

## 2023-08-16 ENCOUNTER — TELEPHONE (OUTPATIENT)
Dept: FAMILY MEDICINE CLINIC | Age: 63
End: 2023-08-16

## 2023-08-16 NOTE — TELEPHONE ENCOUNTER
Patient is having c/o left side face being lopsided, patient was advised and agreed to be evaluated at ED. Patient lov with provider 10/2022

## 2023-08-25 DIAGNOSIS — S76.112A QUADRICEPS STRAIN, LEFT, INITIAL ENCOUNTER: ICD-10-CM

## 2023-08-25 RX ORDER — IBUPROFEN 800 MG/1
800 TABLET ORAL EVERY 8 HOURS PRN
Qty: 90 TABLET | Refills: 0 | Status: SHIPPED | OUTPATIENT
Start: 2023-08-25 | End: 2023-09-22

## 2023-08-26 ENCOUNTER — APPOINTMENT (OUTPATIENT)
Dept: GENERAL RADIOLOGY | Age: 63
End: 2023-08-26
Payer: COMMERCIAL

## 2023-08-26 ENCOUNTER — HOSPITAL ENCOUNTER (EMERGENCY)
Age: 63
Discharge: HOME | End: 2023-08-26
Attending: EMERGENCY MEDICINE
Payer: COMMERCIAL

## 2023-08-26 VITALS
SYSTOLIC BLOOD PRESSURE: 190 MMHG | WEIGHT: 172 LBS | DIASTOLIC BLOOD PRESSURE: 97 MMHG | RESPIRATION RATE: 15 BRPM | HEART RATE: 50 BPM | OXYGEN SATURATION: 99 % | TEMPERATURE: 98.2 F | BODY MASS INDEX: 23.99 KG/M2

## 2023-08-26 DIAGNOSIS — R07.9 CHEST PAIN, UNSPECIFIED TYPE: ICD-10-CM

## 2023-08-26 DIAGNOSIS — G24.5 BLEPHAROSPASM: Primary | ICD-10-CM

## 2023-08-26 LAB
ANION GAP SERPL CALCULATED.3IONS-SCNC: 12 MMOL/L (ref 9–17)
BASOPHILS # BLD: <0.03 K/UL (ref 0–0.2)
BASOPHILS NFR BLD: 0 % (ref 0–2)
BUN SERPL-MCNC: 17 MG/DL (ref 8–23)
CALCIUM SERPL-MCNC: 9.3 MG/DL (ref 8.6–10.4)
CHLORIDE SERPL-SCNC: 105 MMOL/L (ref 98–107)
CO2 SERPL-SCNC: 21 MMOL/L (ref 20–31)
CREAT SERPL-MCNC: 0.8 MG/DL (ref 0.7–1.2)
EOSINOPHIL # BLD: 0.07 K/UL (ref 0–0.44)
EOSINOPHILS RELATIVE PERCENT: 1 % (ref 1–4)
ERYTHROCYTE [DISTWIDTH] IN BLOOD BY AUTOMATED COUNT: 13.9 % (ref 11.8–14.4)
GFR SERPL CREATININE-BSD FRML MDRD: >60 ML/MIN/1.73M2
GLUCOSE SERPL-MCNC: 107 MG/DL (ref 70–99)
HCT VFR BLD AUTO: 46.1 % (ref 40.7–50.3)
HGB BLD-MCNC: 15.2 G/DL (ref 13–17)
IMM GRANULOCYTES # BLD AUTO: <0.03 K/UL (ref 0–0.3)
IMM GRANULOCYTES NFR BLD: 0 %
LYMPHOCYTES NFR BLD: 1 K/UL (ref 1.1–3.7)
LYMPHOCYTES RELATIVE PERCENT: 19 % (ref 24–43)
MAGNESIUM SERPL-MCNC: 2.3 MG/DL (ref 1.6–2.6)
MCH RBC QN AUTO: 33.1 PG (ref 25.2–33.5)
MCHC RBC AUTO-ENTMCNC: 33 G/DL (ref 28.4–34.8)
MCV RBC AUTO: 100.4 FL (ref 82.6–102.9)
MONOCYTES NFR BLD: 0.44 K/UL (ref 0.1–1.2)
MONOCYTES NFR BLD: 9 % (ref 3–12)
NEUTROPHILS NFR BLD: 71 % (ref 36–65)
NEUTS SEG NFR BLD: 3.64 K/UL (ref 1.5–8.1)
NRBC BLD-RTO: 0 PER 100 WBC
PLATELET # BLD AUTO: 231 K/UL (ref 138–453)
PMV BLD AUTO: 10.2 FL (ref 8.1–13.5)
POTASSIUM SERPL-SCNC: 4.2 MMOL/L (ref 3.7–5.3)
RBC # BLD AUTO: 4.59 M/UL (ref 4.21–5.77)
SODIUM SERPL-SCNC: 138 MMOL/L (ref 135–144)
TROPONIN I SERPL HS-MCNC: 7 NG/L (ref 0–22)
TROPONIN I SERPL HS-MCNC: 8 NG/L (ref 0–22)
WBC OTHER # BLD: 5.2 K/UL (ref 3.5–11.3)

## 2023-08-26 PROCEDURE — 84484 ASSAY OF TROPONIN QUANT: CPT

## 2023-08-26 PROCEDURE — 93005 ELECTROCARDIOGRAM TRACING: CPT | Performed by: STUDENT IN AN ORGANIZED HEALTH CARE EDUCATION/TRAINING PROGRAM

## 2023-08-26 PROCEDURE — 80048 BASIC METABOLIC PNL TOTAL CA: CPT

## 2023-08-26 PROCEDURE — 99285 EMERGENCY DEPT VISIT HI MDM: CPT

## 2023-08-26 PROCEDURE — 71046 X-RAY EXAM CHEST 2 VIEWS: CPT

## 2023-08-26 PROCEDURE — 83735 ASSAY OF MAGNESIUM: CPT

## 2023-08-26 PROCEDURE — 85025 COMPLETE CBC W/AUTO DIFF WBC: CPT

## 2023-08-26 RX ORDER — MAGNESIUM SULFATE IN WATER 40 MG/ML
2000 INJECTION, SOLUTION INTRAVENOUS ONCE
Status: DISCONTINUED | OUTPATIENT
Start: 2023-08-26 | End: 2023-08-26

## 2023-08-26 ASSESSMENT — ENCOUNTER SYMPTOMS
SHORTNESS OF BREATH: 0
CONSTIPATION: 0
DIARRHEA: 0
FACIAL SWELLING: 0
EYE REDNESS: 0
VOMITING: 0
TROUBLE SWALLOWING: 0
SINUS PRESSURE: 0
NAUSEA: 0
EYE ITCHING: 0
BACK PAIN: 0
SORE THROAT: 0
RHINORRHEA: 0
ABDOMINAL PAIN: 0
EYE PAIN: 0

## 2023-08-26 ASSESSMENT — HEART SCORE: ECG: 1

## 2023-08-26 NOTE — ED NOTES
The following labs were labeled with appropriate pt sticker and tubed to lab:     [] Blue     [] Lavender   [] on ice  [x] Green/yellow  [] Green/black [] on ice  [] Maicol Raider  [] on ice  [] Yellow  [] Red  [] Type/ Screen  [] ABG  [] VBG    [] COVID-19 swab    [] Rapid  [] PCR  [] Flu swab  [] Peds Viral Panel     [] Urine Sample  [] Fecal Sample  [] Pelvic Cultures  [] Blood Cultures  [] X 2  [] STREP Cultures     Armen Barger RN  08/26/23 3667

## 2023-08-26 NOTE — ED NOTES
The following labs were labeled with appropriate pt sticker and tubed to lab:     [x] Blue     [x] Lavender   [] on ice  [x] Green/yellow  [] Green/black [] on ice  [] Francena Achilles  [] on ice  [] Yellow  [x] Red  [] Type/ Screen  [] ABG  [] VBG    [] COVID-19 swab    [] Rapid  [] PCR  [] Flu swab  [] Peds Viral Panel     [] Urine Sample  [] Fecal Sample  [] Pelvic Cultures  [] Blood Cultures  [] X 2  [] STREP Cultures       Conner Medina RN  08/26/23 3428

## 2023-08-26 NOTE — ED NOTES
Patient presented to the ED with complaints of left eye twitching. Patient states that it has been ongoing for quite some time and spoke with his family doctor yesterday and she advised him to come to the ED. Patient states that he drank one beer today and the twitching in the eye got worse and he states that the left side of his face was tingling and numb. Patient is in NAD, respirations are even and unlabored and call light is with in reach. Resident is bedside for evaluation. Writer will continue with plan of care.       Cheryl Pacheco RN  08/26/23 3178

## 2023-08-26 NOTE — DISCHARGE INSTRUCTIONS
You were evaluated in the emergency department for eye twitching and chest pain. Your cardiac work-up did not reveal any acute abnormalities. Please follow-up with your cardiologist as soon as possible. The twitching of your left eye is likely blepharospasm. This is a twitching of the muscles around your eye. Information regarding blepharospasm given with discharge paperwork. Please follow-up with your PCP. Please return to the emergency department for any worsening symptoms, questions or concerns.

## 2023-08-27 LAB
EKG ATRIAL RATE: 60 BPM
EKG P AXIS: 80 DEGREES
EKG P-R INTERVAL: 124 MS
EKG Q-T INTERVAL: 386 MS
EKG QRS DURATION: 80 MS
EKG QTC CALCULATION (BAZETT): 386 MS
EKG R AXIS: -3 DEGREES
EKG T AXIS: 8 DEGREES
EKG VENTRICULAR RATE: 60 BPM

## 2023-08-28 PROCEDURE — 93010 ELECTROCARDIOGRAM REPORT: CPT | Performed by: INTERNAL MEDICINE

## 2023-08-29 ENCOUNTER — TELEPHONE (OUTPATIENT)
Dept: FAMILY MEDICINE CLINIC | Age: 63
End: 2023-08-29

## 2023-09-05 ENCOUNTER — OFFICE VISIT (OUTPATIENT)
Dept: FAMILY MEDICINE CLINIC | Age: 63
End: 2023-09-05
Payer: COMMERCIAL

## 2023-09-05 VITALS
HEART RATE: 78 BPM | DIASTOLIC BLOOD PRESSURE: 80 MMHG | BODY MASS INDEX: 22.79 KG/M2 | HEIGHT: 71 IN | WEIGHT: 162.8 LBS | SYSTOLIC BLOOD PRESSURE: 134 MMHG

## 2023-09-05 DIAGNOSIS — E78.2 MIXED HYPERLIPIDEMIA: ICD-10-CM

## 2023-09-05 DIAGNOSIS — R63.4 WEIGHT LOSS, UNINTENTIONAL: ICD-10-CM

## 2023-09-05 DIAGNOSIS — F10.10 ALCOHOL ABUSE: ICD-10-CM

## 2023-09-05 DIAGNOSIS — I10 ESSENTIAL HYPERTENSION: Primary | ICD-10-CM

## 2023-09-05 DIAGNOSIS — C61 PROSTATE CANCER (HCC): ICD-10-CM

## 2023-09-05 DIAGNOSIS — I25.10 CORONARY ARTERY DISEASE INVOLVING NATIVE CORONARY ARTERY OF NATIVE HEART WITHOUT ANGINA PECTORIS: ICD-10-CM

## 2023-09-05 PROCEDURE — 3079F DIAST BP 80-89 MM HG: CPT | Performed by: FAMILY MEDICINE

## 2023-09-05 PROCEDURE — 3075F SYST BP GE 130 - 139MM HG: CPT | Performed by: FAMILY MEDICINE

## 2023-09-05 PROCEDURE — 99214 OFFICE O/P EST MOD 30 MIN: CPT | Performed by: FAMILY MEDICINE

## 2023-09-05 SDOH — ECONOMIC STABILITY: HOUSING INSECURITY
IN THE LAST 12 MONTHS, WAS THERE A TIME WHEN YOU DID NOT HAVE A STEADY PLACE TO SLEEP OR SLEPT IN A SHELTER (INCLUDING NOW)?: NO

## 2023-09-05 SDOH — ECONOMIC STABILITY: FOOD INSECURITY: WITHIN THE PAST 12 MONTHS, YOU WORRIED THAT YOUR FOOD WOULD RUN OUT BEFORE YOU GOT MONEY TO BUY MORE.: NEVER TRUE

## 2023-09-05 SDOH — ECONOMIC STABILITY: FOOD INSECURITY: WITHIN THE PAST 12 MONTHS, THE FOOD YOU BOUGHT JUST DIDN'T LAST AND YOU DIDN'T HAVE MONEY TO GET MORE.: NEVER TRUE

## 2023-09-05 SDOH — ECONOMIC STABILITY: INCOME INSECURITY: HOW HARD IS IT FOR YOU TO PAY FOR THE VERY BASICS LIKE FOOD, HOUSING, MEDICAL CARE, AND HEATING?: NOT HARD AT ALL

## 2023-09-05 ASSESSMENT — ENCOUNTER SYMPTOMS
SHORTNESS OF BREATH: 0
DIARRHEA: 0
EYES NEGATIVE: 1
CONSTIPATION: 0
BLOOD IN STOOL: 0
ALLERGIC/IMMUNOLOGIC NEGATIVE: 1
COUGH: 0
ABDOMINAL PAIN: 0

## 2023-09-05 ASSESSMENT — PATIENT HEALTH QUESTIONNAIRE - PHQ9
1. LITTLE INTEREST OR PLEASURE IN DOING THINGS: 0
2. FEELING DOWN, DEPRESSED OR HOPELESS: 0
SUM OF ALL RESPONSES TO PHQ QUESTIONS 1-9: 0
SUM OF ALL RESPONSES TO PHQ9 QUESTIONS 1 & 2: 0

## 2023-09-05 NOTE — PROGRESS NOTES
0    losartan (COZAAR) 25 MG tablet TAKE 1 TABLET BY MOUTH EVERY DAY 90 tablet 3    terbinafine (LAMISIL) 250 MG tablet TAKE 1 TABLET BY MOUTH EVERY DAY 84 tablet 0    aspirin 81 MG chewable tablet Take 1 tablet by mouth      atorvastatin (LIPITOR) 40 MG tablet Take 1 tablet by mouth daily      Magnesium 400 MG CAPS Take 400 mg by mouth daily 30 capsule 5     No current facility-administered medications for this visit. ALLERGIES    No Known Allergies    PHYSICAL EXAM   Physical Exam  Vitals reviewed. Constitutional:       Appearance: He is well-developed. HENT:      Head: Normocephalic. Eyes:      Pupils: Pupils are equal, round, and reactive to light. Neck:      Thyroid: No thyromegaly. Cardiovascular:      Rate and Rhythm: Normal rate and regular rhythm. Heart sounds: Normal heart sounds. No murmur heard. Pulmonary:      Effort: Pulmonary effort is normal.      Breath sounds: Normal breath sounds. No wheezing or rales. Abdominal:      Palpations: Abdomen is soft. Tenderness: There is no abdominal tenderness. There is no guarding or rebound. Musculoskeletal:         General: No tenderness or deformity. Normal range of motion. Cervical back: Normal range of motion and neck supple. Lymphadenopathy:      Cervical: No cervical adenopathy. Skin:     General: Skin is warm and dry. Neurological:      Mental Status: He is alert and oriented to person, place, and time. Psychiatric:         Mood and Affect: Mood normal.         Behavior: Behavior normal.         Thought Content: Thought content normal.         Judgment: Judgment normal.       ASSESSMENT/PLAN  1. Essential hypertension  Chronic, stable, continue current medication and/or plan    - Comprehensive Metabolic Panel; Future  - Vitamin D 25 Hydroxy; Future    2. Coronary artery disease involving native coronary artery of native heart without angina pectoris  Follow up with cardiologist as planned.      3. Prostate cancer

## 2023-09-07 ENCOUNTER — HOSPITAL ENCOUNTER (OUTPATIENT)
Age: 63
Discharge: HOME | End: 2023-09-07
Payer: COMMERCIAL

## 2023-09-07 DIAGNOSIS — I10 ESSENTIAL HYPERTENSION: ICD-10-CM

## 2023-09-07 DIAGNOSIS — R63.4 WEIGHT LOSS, UNINTENTIONAL: ICD-10-CM

## 2023-09-07 DIAGNOSIS — E78.2 MIXED HYPERLIPIDEMIA: ICD-10-CM

## 2023-09-07 LAB
25(OH)D3 SERPL-MCNC: 24.2 NG/ML
ALBUMIN SERPL-MCNC: 4.4 G/DL (ref 3.5–5.2)
ALBUMIN/GLOB SERPL: 1.7 {RATIO} (ref 1–2.5)
ALP SERPL-CCNC: 98 U/L (ref 40–129)
ALT SERPL-CCNC: 44 U/L (ref 5–41)
ANION GAP SERPL CALCULATED.3IONS-SCNC: 8 MMOL/L (ref 9–17)
AST SERPL-CCNC: 25 U/L
BASOPHILS # BLD: <0.03 K/UL (ref 0–0.2)
BASOPHILS NFR BLD: 0 % (ref 0–2)
BILIRUB SERPL-MCNC: 0.4 MG/DL (ref 0.3–1.2)
BUN SERPL-MCNC: 16 MG/DL (ref 8–23)
CALCIUM SERPL-MCNC: 9.1 MG/DL (ref 8.6–10.4)
CHLORIDE SERPL-SCNC: 101 MMOL/L (ref 98–107)
CHOLEST SERPL-MCNC: 209 MG/DL
CHOLESTEROL/HDL RATIO: 3.8
CO2 SERPL-SCNC: 26 MMOL/L (ref 20–31)
CREAT SERPL-MCNC: 1 MG/DL (ref 0.7–1.2)
EOSINOPHIL # BLD: 0.04 K/UL (ref 0–0.44)
EOSINOPHILS RELATIVE PERCENT: 1 % (ref 1–4)
ERYTHROCYTE [DISTWIDTH] IN BLOOD BY AUTOMATED COUNT: 13.3 % (ref 11.8–14.4)
GFR SERPL CREATININE-BSD FRML MDRD: >60 ML/MIN/1.73M2
GLUCOSE SERPL-MCNC: 104 MG/DL (ref 70–99)
HCT VFR BLD AUTO: 47.2 % (ref 40.7–50.3)
HDLC SERPL-MCNC: 55 MG/DL
HGB BLD-MCNC: 15.8 G/DL (ref 13–17)
IMM GRANULOCYTES # BLD AUTO: <0.03 K/UL (ref 0–0.3)
IMM GRANULOCYTES NFR BLD: 0 %
LDLC SERPL CALC-MCNC: 124 MG/DL (ref 0–130)
LYMPHOCYTES NFR BLD: 1.21 K/UL (ref 1.1–3.7)
LYMPHOCYTES RELATIVE PERCENT: 27 % (ref 24–43)
MCH RBC QN AUTO: 32.9 PG (ref 25.2–33.5)
MCHC RBC AUTO-ENTMCNC: 33.5 G/DL (ref 28.4–34.8)
MCV RBC AUTO: 98.3 FL (ref 82.6–102.9)
MONOCYTES NFR BLD: 0.42 K/UL (ref 0.1–1.2)
MONOCYTES NFR BLD: 9 % (ref 3–12)
NEUTROPHILS NFR BLD: 63 % (ref 36–65)
NEUTS SEG NFR BLD: 2.81 K/UL (ref 1.5–8.1)
NRBC BLD-RTO: 0 PER 100 WBC
PLATELET # BLD AUTO: 259 K/UL (ref 138–453)
PMV BLD AUTO: 10.4 FL (ref 8.1–13.5)
POTASSIUM SERPL-SCNC: 4 MMOL/L (ref 3.7–5.3)
PROT SERPL-MCNC: 7 G/DL (ref 6.4–8.3)
RBC # BLD AUTO: 4.8 M/UL (ref 4.21–5.77)
SODIUM SERPL-SCNC: 135 MMOL/L (ref 135–144)
TRIGL SERPL-MCNC: 151 MG/DL
TSH SERPL DL<=0.05 MIU/L-ACNC: 0.98 UIU/ML (ref 0.3–5)
WBC OTHER # BLD: 4.5 K/UL (ref 3.5–11.3)

## 2023-09-07 PROCEDURE — 80053 COMPREHEN METABOLIC PANEL: CPT

## 2023-09-07 PROCEDURE — 80061 LIPID PANEL: CPT

## 2023-09-07 PROCEDURE — 82306 VITAMIN D 25 HYDROXY: CPT

## 2023-09-07 PROCEDURE — 84443 ASSAY THYROID STIM HORMONE: CPT

## 2023-09-07 PROCEDURE — 85025 COMPLETE CBC W/AUTO DIFF WBC: CPT

## 2023-09-07 PROCEDURE — 36415 COLL VENOUS BLD VENIPUNCTURE: CPT

## 2023-09-21 DIAGNOSIS — I10 ESSENTIAL HYPERTENSION: ICD-10-CM

## 2023-09-21 RX ORDER — LOSARTAN POTASSIUM 25 MG/1
TABLET ORAL
Qty: 30 TABLET | Refills: 11 | Status: SHIPPED | OUTPATIENT
Start: 2023-09-21

## 2023-09-21 NOTE — TELEPHONE ENCOUNTER
Frankey Sit is calling to request a refill on the following medication(s):    Last Visit Date (If Applicable):  6/4/9713    Next Visit Date:    Visit date not found    Medication Request:  Requested Prescriptions     Pending Prescriptions Disp Refills    losartan (COZAAR) 25 MG tablet [Pharmacy Med Name: LOSARTAN POTASSIUM 25 MG TAB] 30 tablet 11     Sig: TAKE 1 TABLET BY MOUTH EVERY DAY

## 2023-09-22 DIAGNOSIS — S76.112A QUADRICEPS STRAIN, LEFT, INITIAL ENCOUNTER: ICD-10-CM

## 2023-09-22 RX ORDER — IBUPROFEN 800 MG/1
800 TABLET ORAL EVERY 8 HOURS PRN
Qty: 90 TABLET | Refills: 0 | Status: SHIPPED | OUTPATIENT
Start: 2023-09-22

## 2023-09-22 NOTE — TELEPHONE ENCOUNTER
Peyton Fernandez is calling to request a refill on the following medication(s):    Last Visit Date (If Applicable):  4/7/4019    Next Visit Date:    Visit date not found    Medication Request:  Requested Prescriptions     Pending Prescriptions Disp Refills    ibuprofen (ADVIL;MOTRIN) 800 MG tablet [Pharmacy Med Name: IBUPROFEN 800 MG TABLET] 90 tablet 0     Sig: TAKE 1 TABLET BY MOUTH EVERY 8 HOURS AS NEEDED FOR PAIN

## 2023-10-27 NOTE — TELEPHONE ENCOUNTER
----- Message from [facility] sent at 12/14/2022  9:28 AM EST -----  Subject: Message to Provider    QUESTIONS  Information for Provider? Pt wants a copy of [facility] paperwork. Please call   him when he can pickup  ---------------------------------------------------------------------------  --------------  [employer]  [phone]; OK to leave message on voicemail  ---------------------------------------------------------------------------  --------------  SCRIPT ANSWERS  Relationship to Patient?  Self
Left message to contact the office.'
Spoke with patient a copy of [facility] paper work with be at the  for him to  and faxed to his work.
General

## 2023-11-14 ENCOUNTER — TELEPHONE (OUTPATIENT)
Dept: FAMILY MEDICINE CLINIC | Age: 63
End: 2023-11-14

## 2023-11-14 DIAGNOSIS — R05.3 CHRONIC COUGH: Primary | ICD-10-CM

## 2023-11-14 RX ORDER — BENZONATATE 200 MG/1
200 CAPSULE ORAL 3 TIMES DAILY PRN
Qty: 30 CAPSULE | Refills: 1 | Status: SHIPPED | OUTPATIENT
Start: 2023-11-14

## 2023-11-14 NOTE — TELEPHONE ENCOUNTER
Patient called has had a bad cough for the last couple days and its worse at night, he does not like the OTC medication. Please advise.

## 2023-12-27 ENCOUNTER — TELEPHONE (OUTPATIENT)
Dept: FAMILY MEDICINE CLINIC | Age: 63
End: 2023-12-27

## 2023-12-27 NOTE — TELEPHONE ENCOUNTER
----- Message from Mingo Walker sent at 12/27/2023  8:10 AM EST -----  Subject: Appointment Request    Reason for Call: Established Patient Appointment needed: Flu Shot    QUESTIONS    Reason for appointment request? Available appointments did not meet   patient need     Additional Information for Provider? Pt needs a flu shot scheduled.    ---------------------------------------------------------------------------  --------------  Michelle Mora [facility]  [phone]; OK to leave message on voicemail  ---------------------------------------------------------------------------  --------------  SCRIPT ANSWERS

## 2023-12-28 ENCOUNTER — NURSE ONLY (OUTPATIENT)
Dept: FAMILY MEDICINE CLINIC | Age: 63
End: 2023-12-28
Payer: COMMERCIAL

## 2023-12-28 VITALS
HEART RATE: 61 BPM | SYSTOLIC BLOOD PRESSURE: 138 MMHG | BODY MASS INDEX: 23.99 KG/M2 | WEIGHT: 172 LBS | OXYGEN SATURATION: 97 % | DIASTOLIC BLOOD PRESSURE: 82 MMHG

## 2023-12-28 DIAGNOSIS — Z23 FLU VACCINE NEED: Primary | ICD-10-CM

## 2023-12-28 PROCEDURE — 90471 IMMUNIZATION ADMIN: CPT | Performed by: FAMILY MEDICINE

## 2023-12-28 PROCEDURE — 3079F DIAST BP 80-89 MM HG: CPT | Performed by: FAMILY MEDICINE

## 2023-12-28 PROCEDURE — 90674 CCIIV4 VAC NO PRSV 0.5 ML IM: CPT | Performed by: FAMILY MEDICINE

## 2023-12-28 PROCEDURE — 99211 OFF/OP EST MAY X REQ PHY/QHP: CPT | Performed by: FAMILY MEDICINE

## 2023-12-28 PROCEDURE — 3075F SYST BP GE 130 - 139MM HG: CPT | Performed by: FAMILY MEDICINE

## 2024-01-08 ENCOUNTER — TELEPHONE (OUTPATIENT)
Dept: FAMILY MEDICINE CLINIC | Age: 64
End: 2024-01-08

## 2024-03-08 ENCOUNTER — TELEPHONE (OUTPATIENT)
Dept: FAMILY MEDICINE CLINIC | Age: 64
End: 2024-03-08

## 2024-03-08 DIAGNOSIS — G51.4 FACIAL TWITCHING: Primary | ICD-10-CM

## 2024-03-08 NOTE — TELEPHONE ENCOUNTER
Face/eye twitches on the left side , everything is good with his heart doctor. On and off for a while like a year but it's been twitching more would like to see a specialist. .

## 2024-03-13 ENCOUNTER — HOSPITAL ENCOUNTER (OUTPATIENT)
Age: 64
Setting detail: SPECIMEN
Discharge: HOME | End: 2024-03-13

## 2024-03-13 ENCOUNTER — OFFICE VISIT (OUTPATIENT)
Dept: NEUROLOGY | Age: 64
End: 2024-03-13
Payer: COMMERCIAL

## 2024-03-13 VITALS
WEIGHT: 174 LBS | HEIGHT: 71 IN | DIASTOLIC BLOOD PRESSURE: 86 MMHG | HEART RATE: 61 BPM | BODY MASS INDEX: 24.36 KG/M2 | SYSTOLIC BLOOD PRESSURE: 139 MMHG

## 2024-03-13 DIAGNOSIS — R20.0 RIGHT ARM NUMBNESS: ICD-10-CM

## 2024-03-13 DIAGNOSIS — G24.5 BLEPHAROSPASM OF LEFT EYE: ICD-10-CM

## 2024-03-13 DIAGNOSIS — G44.209 TENSION HEADACHE: ICD-10-CM

## 2024-03-13 DIAGNOSIS — G24.5 BLEPHAROSPASM OF LEFT EYE: Primary | ICD-10-CM

## 2024-03-13 LAB
FOLATE SERPL-MCNC: >20 NG/ML (ref 4.8–24.2)
MAGNESIUM, IONIZED: 0.78 MMOL/L (ref 0.45–0.6)
VIT B12 SERPL-MCNC: 669 PG/ML (ref 232–1245)

## 2024-03-13 PROCEDURE — 3079F DIAST BP 80-89 MM HG: CPT | Performed by: PHYSICIAN ASSISTANT

## 2024-03-13 PROCEDURE — 3075F SYST BP GE 130 - 139MM HG: CPT | Performed by: PHYSICIAN ASSISTANT

## 2024-03-13 PROCEDURE — 99204 OFFICE O/P NEW MOD 45 MIN: CPT | Performed by: PHYSICIAN ASSISTANT

## 2024-03-13 RX ORDER — ISOSORBIDE MONONITRATE 30 MG/1
30 TABLET, EXTENDED RELEASE ORAL DAILY
COMMUNITY
Start: 2023-10-09

## 2024-03-13 RX ORDER — MAGNESIUM OXIDE 400 MG/1
400 TABLET ORAL DAILY
Qty: 30 TABLET | Refills: 5 | Status: SHIPPED | OUTPATIENT
Start: 2024-03-13

## 2024-03-13 NOTE — PROGRESS NOTES
of distress, appears stated age   Head:  Normocephalic, no signs of trauma   Eyes:  Conjunctiva/corneas clear;  eyelids intact   Ears:  Normal external ear    Nose: Nares normal no drainage    Throat: Lips and tongue normal; teeth normal;  normal gingiva   Neck: Supple, intact ROM  trachea midline;     Back:   Symmetric, no curvature, ROM adequate   Lungs:   Respirations unlabored   Heart:  Regular rate    Extremities: Extremities normal, no cyanosis, no edema   Skin: Skin color, texture normal, no rashes, no lesions       Mental status    Alert and oriented x 3; intact memory with no confusion, speech or language problems; no hallucinations or delusions     Cranial nerves    II - visual fields intact to confrontation  III, IV, VI - extra-ocular muscles full: no pupillary defect; no LONNIE, no nystagmus, no ptosis   V - normal facial sensation                                                               VII - left blepharospasm, otherwise normal facial movement                                                          VIII - intact hearing                                                                             IX, X - symmetrical palate                                                                  XI - symmetrical shoulder shrug                                                       XII - tongue midline without atrophy or fasciculation      Motor function  Normal muscle bulk and tone; strength 5/5 on all 4 extremities, no pronator drift      Sensory function Intact to light touch, pinprick, vibration on all 4 extremities; subjective altered sensation of left wrist compared to right      Cerebellar Intact fine motor movement. No involuntary movements or tremors. No ataxia or dysmetria on finger to nose or heel to shin testing      Reflex function DTR 2+ on bilateral UE and LE, symmetric.       Gait                   normal base and arm swing        ASSESSMENT / PLAN:   Adan Oneal is a 63 y.o. male that

## 2024-03-15 LAB
ARSENIC BLD-MCNC: <10 UG/L
CADMIUM BLD-MCNC: <1 UG/L
LEAD BLDV-MCNC: <2 UG/DL
MERCURY BLD-MCNC: <2.5 UG/L

## 2024-03-18 ENCOUNTER — TELEPHONE (OUTPATIENT)
Dept: NEUROLOGY | Age: 64
End: 2024-03-18

## 2024-03-18 DIAGNOSIS — G24.5 BLEPHAROSPASM OF LEFT EYE: Primary | ICD-10-CM

## 2024-03-18 LAB — VIT B1 PYROPHOSHATE BLD-SCNC: 140 NMOL/L (ref 70–180)

## 2024-03-18 RX ORDER — DIAZEPAM 5 MG/1
TABLET ORAL
Qty: 1 TABLET | Refills: 0 | Status: SHIPPED | OUTPATIENT
Start: 2024-03-18 | End: 2024-04-01

## 2024-03-18 NOTE — TELEPHONE ENCOUNTER
Adan called into the office stating he has an MRI scheduled for 3/26/2024. He is asking for something to help with his anxiety during this test. Please advise.    His pharmacy of choice is [facility] on [address]

## 2024-03-19 NOTE — TELEPHONE ENCOUNTER
Patient notified of prescription and instructions. Patient advised to have someone else drive him to MRI appointment. He voiced understanding.

## 2024-03-26 ENCOUNTER — HOSPITAL ENCOUNTER (OUTPATIENT)
Dept: MRI IMAGING | Facility: CLINIC | Age: 64
Discharge: HOME | End: 2024-03-28
Payer: COMMERCIAL

## 2024-03-26 DIAGNOSIS — G24.5 BLEPHAROSPASM OF LEFT EYE: ICD-10-CM

## 2024-03-26 DIAGNOSIS — G44.209 TENSION HEADACHE: ICD-10-CM

## 2024-03-26 DIAGNOSIS — R20.0 RIGHT ARM NUMBNESS: ICD-10-CM

## 2024-03-26 PROCEDURE — 70551 MRI BRAIN STEM W/O DYE: CPT

## 2024-04-09 ENCOUNTER — TELEPHONE (OUTPATIENT)
Dept: NEUROLOGY | Age: 64
End: 2024-04-09

## 2024-04-09 DIAGNOSIS — G24.5 BLEPHAROSPASM OF LEFT EYE: Primary | ICD-10-CM

## 2024-04-09 NOTE — TELEPHONE ENCOUNTER
Writer spoke with patient this morning and this information was given.  Patient verbally stated his understanding.  Patient stated that he continues to have intermitted symptoms of left eye twitching and feeling that his left face \"is slanting down\".  Patient states that he is worried because he had a friend who had similar symptoms and he had a stroke.  Mr. Arellano denies any other symptoms such as weakness, vision issues or slurred speech.  Patient is asking if there is anything else that can be done or what the next step is.  Writer advised that I would forward this message to Lacey.

## 2024-04-09 NOTE — TELEPHONE ENCOUNTER
----- Message from FRANK De Guzman sent at 4/3/2024 12:25 PM EDT -----  No acute findings, mild chronic changes

## 2024-04-09 NOTE — TELEPHONE ENCOUNTER
Blepharospasm, which is his facial twitching, is NOT a stroke symptoms. There is not evidence of stroke on MRI, which is how we make that diagnosis. Arm weakness can come from many other causes than a stroke, such as injuries to the shoulder/ neck/ carpal tunnel. For blepharospasm, if he would like we can try a medication to control symptoms. At time of appt he wanted to complete the MRI first

## 2024-04-11 RX ORDER — BACLOFEN 5 MG/1
5 TABLET ORAL 2 TIMES DAILY PRN
Qty: 60 TABLET | Refills: 2 | Status: SHIPPED | OUTPATIENT
Start: 2024-04-11

## 2024-04-11 NOTE — TELEPHONE ENCOUNTER
Mr. Oneal called the office and this information was given.  Patient would like to proceed with treating the Blepharospasm.  Patient confirmed that he uses [facility] on [address].

## 2024-05-07 RX ORDER — BACLOFEN 5 MG/1
5 TABLET ORAL 2 TIMES DAILY PRN
Qty: 180 TABLET | Refills: 1 | OUTPATIENT
Start: 2024-05-07

## 2024-06-03 ENCOUNTER — OFFICE VISIT (OUTPATIENT)
Dept: NEUROLOGY | Age: 64
End: 2024-06-03
Payer: COMMERCIAL

## 2024-06-03 VITALS
WEIGHT: 172 LBS | HEART RATE: 62 BPM | SYSTOLIC BLOOD PRESSURE: 163 MMHG | BODY MASS INDEX: 24.62 KG/M2 | HEIGHT: 70 IN | DIASTOLIC BLOOD PRESSURE: 95 MMHG

## 2024-06-03 DIAGNOSIS — G24.5 BLEPHAROSPASM OF LEFT EYE: Primary | ICD-10-CM

## 2024-06-03 DIAGNOSIS — G44.209 TENSION HEADACHE: ICD-10-CM

## 2024-06-03 PROCEDURE — 3079F DIAST BP 80-89 MM HG: CPT | Performed by: PHYSICIAN ASSISTANT

## 2024-06-03 PROCEDURE — 99213 OFFICE O/P EST LOW 20 MIN: CPT | Performed by: PHYSICIAN ASSISTANT

## 2024-06-03 PROCEDURE — 3077F SYST BP >= 140 MM HG: CPT | Performed by: PHYSICIAN ASSISTANT

## 2024-06-03 RX ORDER — BACLOFEN 5 MG/1
5 TABLET ORAL 2 TIMES DAILY PRN
Qty: 60 TABLET | Refills: 5 | Status: SHIPPED | OUTPATIENT
Start: 2024-06-03

## 2024-06-03 NOTE — PROGRESS NOTES
(gastroesophageal reflux disease)     History of colon polyps     Hypertension     Osteoarthritis     Prostate cancer (HCC)     Dr. Bernal, Dr. Bee. radiation and seed implants        Past Surgical History:   Procedure Laterality Date    ANKLE SURGERY Left     APPENDECTOMY      COLONOSCOPY      CORONARY ANGIOPLASTY WITH STENT PLACEMENT  2018    Dr. Rinaldi, [facility]    KNEE SURGERY Left     patella    PROSTATE SURGERY  2019    seed implants        Social History     Socioeconomic History    Marital status:      Spouse name: Not on file    Number of children: Not on file    Years of education: Not on file    Highest education level: Not on file   Occupational History    Occupation: floors at [employer]     Comment: plowing, cutting grass for [employer]   Tobacco Use    Smoking status: Former     Current packs/day: 0.00     Types: Cigarettes     Quit date: 1991     Years since quittin.4    Smokeless tobacco: Never   Vaping Use    Vaping Use: Never used   Substance and Sexual Activity    Alcohol use: Yes     Alcohol/week: 21.0 standard drinks of alcohol     Types: 21 Cans of beer per week     Comment: 4-5 beers daily    Drug use: No    Sexual activity: Yes     Partners: Female   Other Topics Concern    Not on file   Social History Narrative    Not on file     Social Determinants of Health     Financial Resource Strain: Low Risk  (2023)    Overall Financial Resource Strain (CARDIA)     Difficulty of Paying Living Expenses: Not hard at all   Food Insecurity: Not on file (2023)   Transportation Needs: Unknown (2023)    PRAPARE - Transportation     Lack of Transportation (Medical): Not on file     Lack of Transportation (Non-Medical): No   Physical Activity: Not on file   Stress: Not on file   Social Connections: Not on file   Intimate Partner Violence: Not on file   Housing Stability: Unknown (2023)    Housing Stability Vital Sign     Unable to Pay for Housing in the Last Year: Not

## 2024-07-12 DIAGNOSIS — S76.112A QUADRICEPS STRAIN, LEFT, INITIAL ENCOUNTER: ICD-10-CM

## 2024-07-12 NOTE — TELEPHONE ENCOUNTER
Adan Oneal is calling to request a refill on the following medication(s):    Last Visit Date (If Applicable):  9/5/2023    Next Visit Date:    Visit date not found    Medication Request:  Requested Prescriptions     Pending Prescriptions Disp Refills    ibuprofen (ADVIL;MOTRIN) 800 MG tablet [Pharmacy Med Name: IBUPROFEN 800 MG TABLET] 90 tablet 0     Sig: TAKE 1 TABLET BY MOUTH EVERY 8 HOURS AS NEEDED FOR PAIN

## 2024-07-13 RX ORDER — IBUPROFEN 800 MG/1
800 TABLET ORAL EVERY 8 HOURS PRN
Qty: 90 TABLET | Refills: 0 | Status: SHIPPED | OUTPATIENT
Start: 2024-07-13

## 2024-08-15 ENCOUNTER — OFFICE VISIT (OUTPATIENT)
Dept: FAMILY MEDICINE CLINIC | Age: 64
End: 2024-08-15
Payer: COMMERCIAL

## 2024-08-15 VITALS
SYSTOLIC BLOOD PRESSURE: 130 MMHG | WEIGHT: 164.4 LBS | DIASTOLIC BLOOD PRESSURE: 66 MMHG | HEART RATE: 72 BPM | BODY MASS INDEX: 23.59 KG/M2

## 2024-08-15 DIAGNOSIS — J20.9 ACUTE BRONCHITIS, UNSPECIFIED ORGANISM: ICD-10-CM

## 2024-08-15 DIAGNOSIS — E55.9 VITAMIN D DEFICIENCY: ICD-10-CM

## 2024-08-15 DIAGNOSIS — Z85.46 HISTORY OF PROSTATE CANCER: ICD-10-CM

## 2024-08-15 DIAGNOSIS — E78.2 MIXED HYPERLIPIDEMIA: ICD-10-CM

## 2024-08-15 DIAGNOSIS — I25.10 CORONARY ARTERY DISEASE INVOLVING NATIVE CORONARY ARTERY OF NATIVE HEART WITHOUT ANGINA PECTORIS: ICD-10-CM

## 2024-08-15 DIAGNOSIS — I10 ESSENTIAL HYPERTENSION: Primary | ICD-10-CM

## 2024-08-15 PROCEDURE — 3075F SYST BP GE 130 - 139MM HG: CPT | Performed by: FAMILY MEDICINE

## 2024-08-15 PROCEDURE — 99214 OFFICE O/P EST MOD 30 MIN: CPT | Performed by: FAMILY MEDICINE

## 2024-08-15 PROCEDURE — 3078F DIAST BP <80 MM HG: CPT | Performed by: FAMILY MEDICINE

## 2024-08-15 RX ORDER — CODEINE PHOSPHATE/GUAIFENESIN 10-100MG/5
5 LIQUID (ML) ORAL 2 TIMES DAILY PRN
Qty: 180 ML | Refills: 0 | Status: SHIPPED | OUTPATIENT
Start: 2024-08-15 | End: 2024-08-29

## 2024-08-15 RX ORDER — AZITHROMYCIN 250 MG/1
TABLET, FILM COATED ORAL
Qty: 1 PACKET | Refills: 0 | Status: SHIPPED | OUTPATIENT
Start: 2024-08-15

## 2024-08-15 RX ORDER — POLYETHYLENE GLYCOL 3350 17 G/17G
17 POWDER, FOR SOLUTION ORAL DAILY PRN
COMMUNITY

## 2024-08-15 ASSESSMENT — ENCOUNTER SYMPTOMS
SHORTNESS OF BREATH: 0
ALLERGIC/IMMUNOLOGIC NEGATIVE: 1
COUGH: 1
DIARRHEA: 0
CONSTIPATION: 0
ABDOMINAL PAIN: 0
EYES NEGATIVE: 1

## 2024-08-15 ASSESSMENT — PATIENT HEALTH QUESTIONNAIRE - PHQ9
SUM OF ALL RESPONSES TO PHQ QUESTIONS 1-9: 0
SUM OF ALL RESPONSES TO PHQ QUESTIONS 1-9: 0
SUM OF ALL RESPONSES TO PHQ9 QUESTIONS 1 & 2: 0
SUM OF ALL RESPONSES TO PHQ QUESTIONS 1-9: 0
SUM OF ALL RESPONSES TO PHQ QUESTIONS 1-9: 0
1. LITTLE INTEREST OR PLEASURE IN DOING THINGS: NOT AT ALL
2. FEELING DOWN, DEPRESSED OR HOPELESS: NOT AT ALL

## 2024-08-15 NOTE — PROGRESS NOTES
[facility]  [facility]  [address]  [address]  [facility] [address]  Dept: [phone]    8/15/2024    CHIEF COMPLAINT    Chief Complaint   Patient presents with    Hypertension       HPI    Adan Oneal is a 63 y.o. male who presents   Chief Complaint   Patient presents with    Hypertension   .  Recheck htn. Was seen by neuro for facial spasms, was prescribed baclofen but has not needed it recently.   Has had a cough and congestion for several days after traveling to [place].         Vitals:    08/15/24 1033   BP: 130/66   Pulse: 72   Weight: 74.6 kg (164 lb 6.4 oz)       REVIEW OF SYSTEMS    Review of Systems   Constitutional:  Negative for fatigue, fever and unexpected weight change.   HENT:  Positive for congestion.    Eyes: Negative.    Respiratory:  Positive for cough. Negative for shortness of breath.    Cardiovascular:  Negative for chest pain and leg swelling.   Gastrointestinal:  Negative for abdominal pain, constipation and diarrhea.   Endocrine: Negative.    Genitourinary:  Negative for frequency and urgency.   Musculoskeletal: Negative.    Skin: Negative.    Allergic/Immunologic: Negative.    Neurological:  Negative for dizziness and headaches.   Hematological: Negative.    Psychiatric/Behavioral:  Negative for sleep disturbance. The patient is not nervous/anxious.        PAST MEDICAL HISTORY    Past Medical History:   Diagnosis Date    Alcohol abuse     Allergic rhinitis     CAD (coronary artery disease) 2018    Chronic back pain     GERD (gastroesophageal reflux disease)     History of colon polyps 2021    Hypertension     Osteoarthritis     Prostate cancer (HCC) 2019    Dr. Bernal, Dr. Bee. radiation and seed implants       FAMILY HISTORY    Family History   Problem Relation Age of Onset    Heart Disease Mother     Heart Disease Father     Heart Disease Sister     Other Sister 54        Face feel like it be Crooked,eye be Twitching.    Heart

## 2024-09-07 LAB
ALBUMIN: NORMAL
ALP BLD-CCNC: NORMAL U/L
ALT SERPL-CCNC: NORMAL U/L
ANION GAP SERPL CALCULATED.3IONS-SCNC: NORMAL MMOL/L
AST SERPL-CCNC: NORMAL U/L
BASOPHILS ABSOLUTE: NORMAL
BASOPHILS RELATIVE PERCENT: NORMAL
BILIRUB SERPL-MCNC: NORMAL MG/DL
BUN BLDV-MCNC: NORMAL MG/DL
CALCIUM SERPL-MCNC: NORMAL MG/DL
CHLORIDE BLD-SCNC: NORMAL MMOL/L
CHOLESTEROL, TOTAL: NORMAL
CHOLESTEROL/HDL RATIO: NORMAL
CO2: NORMAL
CREAT SERPL-MCNC: NORMAL MG/DL
EOSINOPHILS ABSOLUTE: NORMAL
EOSINOPHILS RELATIVE PERCENT: NORMAL
GFR, ESTIMATED: NORMAL
GLUCOSE BLD-MCNC: NORMAL MG/DL
HCT VFR BLD CALC: NORMAL %
HDLC SERPL-MCNC: NORMAL MG/DL
HEMOGLOBIN: NORMAL
LDL CHOLESTEROL: NORMAL
LYMPHOCYTES ABSOLUTE: NORMAL
LYMPHOCYTES RELATIVE PERCENT: NORMAL
MCH RBC QN AUTO: NORMAL PG
MCHC RBC AUTO-ENTMCNC: NORMAL G/DL
MCV RBC AUTO: NORMAL FL
MONOCYTES ABSOLUTE: NORMAL
MONOCYTES RELATIVE PERCENT: NORMAL
NEUTROPHILS ABSOLUTE: NORMAL
NEUTROPHILS RELATIVE PERCENT: NORMAL
NONHDLC SERPL-MCNC: NORMAL MG/DL
PDW BLD-RTO: NORMAL %
PLATELET # BLD: NORMAL 10*3/UL
PMV BLD AUTO: NORMAL FL
POTASSIUM SERPL-SCNC: NORMAL MMOL/L
RBC # BLD: NORMAL 10*6/UL
SODIUM BLD-SCNC: NORMAL MMOL/L
TOTAL PROTEIN: NORMAL
TRIGL SERPL-MCNC: NORMAL MG/DL
VITAMIN D 25-HYDROXY: NORMAL
VITAMIN D2, 25 HYDROXY: NORMAL
VITAMIN D3,25 HYDROXY: NORMAL
VLDLC SERPL CALC-MCNC: NORMAL MG/DL
WBC # BLD: NORMAL 10*3/UL

## 2024-09-10 DIAGNOSIS — Z85.46 HISTORY OF PROSTATE CANCER: ICD-10-CM

## 2024-09-10 DIAGNOSIS — E78.2 MIXED HYPERLIPIDEMIA: ICD-10-CM

## 2024-09-10 DIAGNOSIS — E55.9 VITAMIN D DEFICIENCY: ICD-10-CM

## 2024-09-10 DIAGNOSIS — I10 ESSENTIAL HYPERTENSION: ICD-10-CM

## 2024-10-17 DIAGNOSIS — I10 ESSENTIAL HYPERTENSION: ICD-10-CM

## 2024-10-17 RX ORDER — LOSARTAN POTASSIUM 25 MG/1
TABLET ORAL
Qty: 30 TABLET | Refills: 11 | Status: SHIPPED | OUTPATIENT
Start: 2024-10-17

## 2024-10-17 NOTE — TELEPHONE ENCOUNTER
Adan Oneal is calling to request a refill on the following medication(s):    Last Visit Date (If Applicable):  8/15/2024    Next Visit Date:    Visit date not found    Medication Request:  Requested Prescriptions     Pending Prescriptions Disp Refills    losartan (COZAAR) 25 MG tablet [Pharmacy Med Name: LOSARTAN POTASSIUM 25 MG TAB] 30 tablet 11     Sig: TAKE 1 TABLET BY MOUTH EVERY DAY                Statement Selected

## 2024-12-02 ENCOUNTER — OFFICE VISIT (OUTPATIENT)
Dept: NEUROLOGY | Age: 64
End: 2024-12-02
Payer: COMMERCIAL

## 2024-12-02 VITALS
SYSTOLIC BLOOD PRESSURE: 181 MMHG | HEIGHT: 70 IN | BODY MASS INDEX: 24.77 KG/M2 | HEART RATE: 66 BPM | WEIGHT: 173 LBS | DIASTOLIC BLOOD PRESSURE: 93 MMHG

## 2024-12-02 DIAGNOSIS — G24.5 BLEPHAROSPASM OF LEFT EYE: Primary | ICD-10-CM

## 2024-12-02 PROCEDURE — 3080F DIAST BP >= 90 MM HG: CPT | Performed by: PHYSICIAN ASSISTANT

## 2024-12-02 PROCEDURE — 3077F SYST BP >= 140 MM HG: CPT | Performed by: PHYSICIAN ASSISTANT

## 2024-12-02 PROCEDURE — 99213 OFFICE O/P EST LOW 20 MIN: CPT | Performed by: PHYSICIAN ASSISTANT

## 2024-12-02 RX ORDER — BACLOFEN 5 MG/1
5 TABLET ORAL 2 TIMES DAILY PRN
Qty: 180 TABLET | Refills: 1 | Status: SHIPPED | OUTPATIENT
Start: 2024-12-02

## 2024-12-02 NOTE — PROGRESS NOTES
[address]  [facility] [address]  Dept: [phone]    PATIENT NAME: Adan Oneal  PATIENT MRN: [id number]  PRIMARY CARE PHYSICIAN: Diane Storey MD    HPI:      Adan Oneal is a 64 y.o. male who presents to clinic today for evaluation of left facial spasm. Medical history is significant for CAD with stent placement, HTN. ASA 81 mg daily.    He has been doing well in terms of blepharospasm and has only been taking 5 mg once daily with good management of symptoms. No side effect of dizziness or sleepiness. No imbalance. No other symptoms of spasticity and no tremor. Denies vision changes/ vision loss.     PCP started him on vit D supplement in October.       Prior information:       Starting in 2023 he has noticed left facial twitching, worse for his left eye. Reading/ staring/ focusing seems to provoke symptoms. No major vision changes, although it may feel blurry if the eyelid is twitching. No right sided head symptoms. There is not major numbness or shooting/ shock-like pain.    He has right sided headaches which seem worse at night. Located frontal/ top of head, not behind the right eye. Character is throbbing. There can be associated blurred vision. No light/ sound sensitivity. No nausea/ vomiting. No paresthesias. No dizziness. Frequency is about 3 nights per week.     Admits to excessive alcohol consumption on weekends.    Balance is stable. No leg spasm, weakness, numbness.     Reports right arm stiffness and aching. It seems weaker than the other arm. There is not pain/ numbness radiating down the arms. No extremity spasm or tremor.       PREVIOUS WORKUP:     Past Medical History:   Diagnosis Date    Alcohol abuse     Allergic rhinitis     CAD (coronary artery disease) 2018    Chronic back pain     GERD (gastroesophageal reflux disease)     History of colon polyps 2021    Hypertension     Osteoarthritis     Prostate cancer (HCC) 2019    Dr. Bernal, Dr. Bee. radiation and seed

## 2025-01-23 NOTE — TELEPHONE ENCOUNTER
Request 90 day supply   Pharmacy requesting refill of Baclofen Lioresal 5 Mg Tablet.      Medication active on med list yes      Date of last Rx: 12/2/2024 with 1 refills          verified by LEÓN POST      Date of last appointment 12/2/2024    Next Visit Date:  Visit date not found

## 2025-01-27 RX ORDER — BACLOFEN 5 MG/1
5 TABLET ORAL 2 TIMES DAILY PRN
Qty: 180 TABLET | Refills: 2 | Status: SHIPPED | OUTPATIENT
Start: 2025-01-27

## 2025-03-13 ENCOUNTER — OFFICE VISIT (OUTPATIENT)
Dept: FAMILY MEDICINE CLINIC | Age: 65
End: 2025-03-13

## 2025-03-13 VITALS
SYSTOLIC BLOOD PRESSURE: 130 MMHG | OXYGEN SATURATION: 97 % | HEART RATE: 71 BPM | DIASTOLIC BLOOD PRESSURE: 90 MMHG | BODY MASS INDEX: 24.17 KG/M2 | HEIGHT: 70 IN | WEIGHT: 168.8 LBS

## 2025-03-13 DIAGNOSIS — I10 ESSENTIAL HYPERTENSION: Primary | ICD-10-CM

## 2025-03-13 DIAGNOSIS — C61 PROSTATE CANCER (HCC): ICD-10-CM

## 2025-03-13 DIAGNOSIS — R61 NIGHT SWEATS: ICD-10-CM

## 2025-03-13 DIAGNOSIS — E78.2 MIXED HYPERLIPIDEMIA: ICD-10-CM

## 2025-03-13 DIAGNOSIS — J20.9 ACUTE BRONCHITIS, UNSPECIFIED ORGANISM: ICD-10-CM

## 2025-03-13 DIAGNOSIS — I25.10 CORONARY ARTERY DISEASE INVOLVING NATIVE CORONARY ARTERY OF NATIVE HEART WITHOUT ANGINA PECTORIS: ICD-10-CM

## 2025-03-13 RX ORDER — AZITHROMYCIN 250 MG/1
TABLET, FILM COATED ORAL
Qty: 1 PACKET | Refills: 0 | Status: SHIPPED | OUTPATIENT
Start: 2025-03-13

## 2025-03-13 RX ORDER — LOSARTAN POTASSIUM 25 MG/1
25 TABLET ORAL DAILY
Qty: 90 TABLET | Refills: 3 | Status: SHIPPED | OUTPATIENT
Start: 2025-03-13

## 2025-03-13 RX ORDER — CODEINE PHOSPHATE AND GUAIFENESIN 10; 100 MG/5ML; MG/5ML
5 SOLUTION ORAL 3 TIMES DAILY PRN
Qty: 105 ML | Refills: 0 | Status: SHIPPED | OUTPATIENT
Start: 2025-03-13 | End: 2025-03-20

## 2025-03-13 SDOH — ECONOMIC STABILITY: FOOD INSECURITY: WITHIN THE PAST 12 MONTHS, THE FOOD YOU BOUGHT JUST DIDN'T LAST AND YOU DIDN'T HAVE MONEY TO GET MORE.: NEVER TRUE

## 2025-03-13 SDOH — ECONOMIC STABILITY: FOOD INSECURITY: WITHIN THE PAST 12 MONTHS, YOU WORRIED THAT YOUR FOOD WOULD RUN OUT BEFORE YOU GOT MONEY TO BUY MORE.: NEVER TRUE

## 2025-03-13 ASSESSMENT — PATIENT HEALTH QUESTIONNAIRE - PHQ9
SUM OF ALL RESPONSES TO PHQ QUESTIONS 1-9: 0
2. FEELING DOWN, DEPRESSED OR HOPELESS: NOT AT ALL
SUM OF ALL RESPONSES TO PHQ QUESTIONS 1-9: 0
1. LITTLE INTEREST OR PLEASURE IN DOING THINGS: NOT AT ALL

## 2025-03-13 NOTE — PROGRESS NOTES
[facility]  [facility] MEDICINE  [address]  [address]  [facility] [address]  Dept: [phone]    3/13/2025    CHIEF COMPLAINT    Chief Complaint   Patient presents with    Hypertension       HPI    Adan Oneal is a 64 y.o. male who presents   Chief Complaint   Patient presents with    Hypertension   .  HPI  History of Present Illness  The patient is a 64-year-old male who presents for a recheck of blood pressure, cholesterol, and currently ill with a cough.    He has been experiencing a persistent cough, which he attributes to potential exposure at his workplace where several colleagues have fallen ill. He reports no known allergies to antibiotics and expresses a preference for cough syrup over pills. He has not monitored his temperature upon waking.    He is under the care of a cardiologist, for his cardiac health. He has a history of a minor myocardial infarction, for which he received a stent. He reports no recent episodes of chest pain upon awakening. He was informed of elevated blood pressure during a recent visit to his podiatrist.    He has been experiencing nocturnal headaches for an extended period. He also reports intermittent night sweats, which he describes as profuse, necessitating the removal of his T-shirt. He has not monitored his temperature upon waking.  Denies chest pain during these episodes.    He had a colonoscopy in 2021 with colon polyps found.    Supplemental Information  He had a colonoscopy in 2021. He had radiation therapy for prostate cancer and his PSA levels are normal. He had some blood work done in the fall, which showed his vitamin D was a little bit low and cholesterol was good.    SOCIAL HISTORY  He does not smoke.    FAMILY HISTORY  His sister had open heart surgery and was brought back to life 4 years ago.  She recently passed away    ALLERGIES  The patient has no known allergies.    MEDICATIONS  Current: losartan    Vitals:

## 2025-04-04 DIAGNOSIS — S76.112A QUADRICEPS STRAIN, LEFT, INITIAL ENCOUNTER: ICD-10-CM

## 2025-04-04 RX ORDER — IBUPROFEN 800 MG/1
800 TABLET, FILM COATED ORAL EVERY 8 HOURS PRN
Qty: 90 TABLET | Refills: 3 | Status: SHIPPED | OUTPATIENT
Start: 2025-04-04

## 2025-07-17 ENCOUNTER — HOSPITAL ENCOUNTER (OUTPATIENT)
Age: 65
Setting detail: SPECIMEN
Discharge: HOME | End: 2025-07-17

## 2025-07-17 ENCOUNTER — OFFICE VISIT (OUTPATIENT)
Dept: FAMILY MEDICINE CLINIC | Age: 65
End: 2025-07-17
Payer: COMMERCIAL

## 2025-07-17 VITALS
HEART RATE: 78 BPM | WEIGHT: 164 LBS | SYSTOLIC BLOOD PRESSURE: 124 MMHG | BODY MASS INDEX: 23.53 KG/M2 | DIASTOLIC BLOOD PRESSURE: 70 MMHG

## 2025-07-17 DIAGNOSIS — E55.9 VITAMIN D DEFICIENCY: ICD-10-CM

## 2025-07-17 DIAGNOSIS — C61 PROSTATE CANCER (HCC): ICD-10-CM

## 2025-07-17 DIAGNOSIS — E78.2 MIXED HYPERLIPIDEMIA: ICD-10-CM

## 2025-07-17 DIAGNOSIS — R61 NIGHT SWEATS: ICD-10-CM

## 2025-07-17 DIAGNOSIS — I10 ESSENTIAL HYPERTENSION: Primary | ICD-10-CM

## 2025-07-17 DIAGNOSIS — I25.10 CORONARY ARTERY DISEASE INVOLVING NATIVE CORONARY ARTERY OF NATIVE HEART WITHOUT ANGINA PECTORIS: ICD-10-CM

## 2025-07-17 DIAGNOSIS — R20.2 NUMBNESS AND TINGLING IN LEFT ARM: ICD-10-CM

## 2025-07-17 DIAGNOSIS — R20.0 NUMBNESS AND TINGLING IN LEFT ARM: ICD-10-CM

## 2025-07-17 DIAGNOSIS — F10.10 ALCOHOL ABUSE: ICD-10-CM

## 2025-07-17 LAB
25(OH)D3 SERPL-MCNC: 35.3 NG/ML (ref 30–100)
ALBUMIN SERPL-MCNC: 4.1 G/DL (ref 3.5–5.2)
ALBUMIN/GLOB SERPL: 1.9 {RATIO} (ref 1–2.5)
ALP SERPL-CCNC: 89 U/L (ref 40–129)
ALT SERPL-CCNC: 43 U/L (ref 10–50)
ANION GAP SERPL CALCULATED.3IONS-SCNC: 9 MMOL/L (ref 9–16)
AST SERPL-CCNC: 29 U/L (ref 10–50)
BASOPHILS # BLD: <0.03 K/UL (ref 0–0.2)
BASOPHILS NFR BLD: 1 % (ref 0–2)
BILIRUB SERPL-MCNC: 0.6 MG/DL (ref 0–1.2)
BUN SERPL-MCNC: 14 MG/DL (ref 8–23)
CALCIUM SERPL-MCNC: 9.4 MG/DL (ref 8.6–10.4)
CHLORIDE SERPL-SCNC: 106 MMOL/L (ref 98–107)
CHOLEST SERPL-MCNC: 129 MG/DL (ref 0–199)
CHOLESTEROL/HDL RATIO: 2.5
CO2 SERPL-SCNC: 27 MMOL/L (ref 20–31)
CREAT SERPL-MCNC: 1.1 MG/DL (ref 0.7–1.2)
EOSINOPHIL # BLD: 0.06 K/UL (ref 0–0.44)
EOSINOPHILS RELATIVE PERCENT: 2 % (ref 1–4)
ERYTHROCYTE [DISTWIDTH] IN BLOOD BY AUTOMATED COUNT: 13.3 % (ref 11.8–14.4)
GFR, ESTIMATED: 75 ML/MIN/1.73M2
GLUCOSE SERPL-MCNC: 103 MG/DL (ref 74–99)
HCT VFR BLD AUTO: 43.6 % (ref 40.7–50.3)
HDLC SERPL-MCNC: 52 MG/DL
HGB BLD-MCNC: 14.2 G/DL (ref 13–17)
IMM GRANULOCYTES # BLD AUTO: <0.03 K/UL (ref 0–0.3)
IMM GRANULOCYTES NFR BLD: 0 %
LDLC SERPL CALC-MCNC: 63 MG/DL (ref 0–100)
LYMPHOCYTES NFR BLD: 1.05 K/UL (ref 1.1–3.7)
LYMPHOCYTES RELATIVE PERCENT: 30 % (ref 24–43)
MCH RBC QN AUTO: 32 PG (ref 25.2–33.5)
MCHC RBC AUTO-ENTMCNC: 32.6 G/DL (ref 28.4–34.8)
MCV RBC AUTO: 98.2 FL (ref 82.6–102.9)
MONOCYTES NFR BLD: 0.44 K/UL (ref 0.1–1.2)
MONOCYTES NFR BLD: 13 % (ref 3–12)
NEUTROPHILS NFR BLD: 54 % (ref 36–65)
NEUTS SEG NFR BLD: 1.95 K/UL (ref 1.5–8.1)
NRBC BLD-RTO: 0 PER 100 WBC
PLATELET # BLD AUTO: 207 K/UL (ref 138–453)
PMV BLD AUTO: 11.2 FL (ref 8.1–13.5)
POTASSIUM SERPL-SCNC: 4 MMOL/L (ref 3.7–5.3)
PROT SERPL-MCNC: 6.3 G/DL (ref 6.6–8.7)
RBC # BLD AUTO: 4.44 M/UL (ref 4.21–5.77)
SODIUM SERPL-SCNC: 142 MMOL/L (ref 136–145)
TRIGL SERPL-MCNC: 71 MG/DL
TSH SERPL DL<=0.05 MIU/L-ACNC: 1.03 UIU/ML (ref 0.27–4.2)
VIT B12 SERPL-MCNC: 505 PG/ML (ref 232–1245)
VLDLC SERPL CALC-MCNC: 14 MG/DL (ref 1–30)
WBC OTHER # BLD: 3.5 K/UL (ref 3.5–11.3)

## 2025-07-17 PROCEDURE — 99214 OFFICE O/P EST MOD 30 MIN: CPT | Performed by: FAMILY MEDICINE

## 2025-07-17 PROCEDURE — 3074F SYST BP LT 130 MM HG: CPT | Performed by: FAMILY MEDICINE

## 2025-07-17 PROCEDURE — 3078F DIAST BP <80 MM HG: CPT | Performed by: FAMILY MEDICINE

## 2025-07-17 NOTE — PROGRESS NOTES
[facility]  [facility] MEDICINE  [address]  [address]  [facility] [address]  Dept: [phone]    7/17/2025    CHIEF COMPLAINT    Chief Complaint   Patient presents with    Tingling     Left arm       HPI    Adan Oneal is a 64 y.o. male who presents   Chief Complaint   Patient presents with    Tingling     Left arm   .  HPI  History of Present Illness  The patient is a 64-year-old male with intermittent tingling in his left arm, occasionally extending to his thumb, lasting up to 10 minutes. No associated weakness, neck issues, chest pain, or shortness of breath. 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 No new medications. Current medications: baby aspirin, Lipitor, baclofen, isosorbide, atorvastatin, losartan, ibuprofen as needed, vitamin D. Upcoming cardiologist appointment on 12/03/2025.    Intermittent Tingling in Left Arm  - Onset: Intermittent.  - Location: Left arm, occasionally extending to thumb.  - Duration: Lasting up to 10 minutes.  - Character: Tingling.  - Alleviating/Aggravating Factors: Suspects repetitive activities like trimming (4 hours daily) might contribute.  - Severity: No associated weakness, neck issues, chest pain, or shortness of breath.    Unintentional Weight Loss  Reports unintentional weight loss due to decreased food intake and increased alcohol consumption during a recent trip.  - Onset: During a recent trip.  - Character: Unintentional weight loss.  - Alleviating/Aggravating Factors: Decreased food intake and increased alcohol consumption.    Social History:  Increased alcohol consumption during a recent trip. No smoking.    Vitals:    07/17/25 0918   BP: 124/70   Pulse: 78   Weight: 74.4 kg (164 lb)       REVIEW OF SYSTEMS    Review of Systems    PAST MEDICAL HISTORY    Past Medical History:   Diagnosis Date    Alcohol abuse     Allergic rhinitis     CAD (coronary artery disease) 2018    Chronic

## 2025-07-29 DIAGNOSIS — E55.9 VITAMIN D DEFICIENCY: ICD-10-CM

## 2025-07-29 RX ORDER — CHOLECALCIFEROL (VITAMIN D3) 25 MCG
1 TABLET ORAL DAILY
Qty: 90 TABLET | Refills: 0 | Status: SHIPPED | OUTPATIENT
Start: 2025-07-29

## 2025-07-29 NOTE — TELEPHONE ENCOUNTER
Adan Oneal is calling to request a refill on the following medication(s):    Last Visit Date (If Applicable):  7/17/2025    Next Visit Date:    1/20/2026    Medication Request:  Requested Prescriptions     Pending Prescriptions Disp Refills    vitamin D3 (CHOLECALCIFEROL) 25 MCG (1000 UT) TABS tablet [Pharmacy Med Name: VITAMIN D3 25 MCG TABLET] 90 tablet 3     Sig: TAKE 1 TABLET BY MOUTH EVERY DAY

## 2025-08-07 DIAGNOSIS — S76.112A QUADRICEPS STRAIN, LEFT, INITIAL ENCOUNTER: ICD-10-CM

## 2025-08-07 RX ORDER — IBUPROFEN 800 MG/1
800 TABLET, FILM COATED ORAL EVERY 8 HOURS PRN
Qty: 90 TABLET | Refills: 3 | Status: SHIPPED | OUTPATIENT
Start: 2025-08-07